# Patient Record
Sex: FEMALE | Race: WHITE | Employment: UNEMPLOYED | URBAN - NONMETROPOLITAN AREA
[De-identification: names, ages, dates, MRNs, and addresses within clinical notes are randomized per-mention and may not be internally consistent; named-entity substitution may affect disease eponyms.]

---

## 2017-04-06 ENCOUNTER — TRANSFERRED RECORDS (OUTPATIENT)
Dept: HEALTH INFORMATION MANAGEMENT | Facility: HOSPITAL | Age: 27
End: 2017-04-06

## 2017-05-04 ENCOUNTER — TRANSFERRED RECORDS (OUTPATIENT)
Dept: HEALTH INFORMATION MANAGEMENT | Facility: HOSPITAL | Age: 27
End: 2017-05-04

## 2017-05-23 ENCOUNTER — OFFICE VISIT (OUTPATIENT)
Dept: OBGYN | Facility: OTHER | Age: 27
End: 2017-05-23
Attending: OBSTETRICS & GYNECOLOGY
Payer: COMMERCIAL

## 2017-05-23 VITALS
WEIGHT: 191 LBS | HEART RATE: 80 BPM | HEIGHT: 61 IN | DIASTOLIC BLOOD PRESSURE: 68 MMHG | OXYGEN SATURATION: 98 % | SYSTOLIC BLOOD PRESSURE: 112 MMHG | BODY MASS INDEX: 36.06 KG/M2

## 2017-05-23 DIAGNOSIS — Z30.2 ENCOUNTER FOR STERILIZATION: Primary | ICD-10-CM

## 2017-05-23 PROCEDURE — 99212 OFFICE O/P EST SF 10 MIN: CPT

## 2017-05-23 PROCEDURE — 99202 OFFICE O/P NEW SF 15 MIN: CPT | Performed by: OBSTETRICS & GYNECOLOGY

## 2017-05-23 RX ORDER — LACTOBACILLUS COMBINATION NO.9 4B CELL
2 CAPSULE ORAL DAILY
COMMUNITY
Start: 2017-04-10 | End: 2018-04-06

## 2017-05-23 ASSESSMENT — PAIN SCALES - GENERAL: PAINLEVEL: NO PAIN (0)

## 2017-05-23 NOTE — MR AVS SNAPSHOT
"              After Visit Summary   2017    Sarina Vila    MRN: [id number]           Patient Information     Date Of Birth          1990        Visit Information        Provider Department      2017 9:00 AM Karthik Epperson MD [facility]        Care Instructions     Federal sterilization request forms will need to be reviewed reviewed and signed after 20 weeks. Pt instructed to do this with Dr. Martinez.          Follow-ups after your visit        Who to contact     If you have questions or need follow up information about today's clinic visit or your schedule please contact [facility] directly at [phone].  Normal or non-critical lab and imaging results will be communicated to you by MyChart, letter or phone within 4 business days after the clinic has received the results. If you do not hear from us within 7 days, please contact the clinic through [employer] [facility] or phone. If you have a critical or abnormal lab result, we will notify you by phone as soon as possible.  Submit refill requests through [employer] or call your pharmacy and they will forward the refill request to us. Please allow 3 business days for your refill to be completed.          Additional Information About Your Visit        MyChart Information     [employer] lets you send messages to your doctor, view your test results, renew your prescriptions, schedule appointments and more. To sign up, go to [email or web address] . Click on \"Log in\" on the left side of the screen, which will take you to the Welcome page. Then click on \"Sign up Now\" on the right side of the page.     You will be asked to enter the access code listed below, as well as some personal information. Please follow the directions to create your username and password.     Your access code is: [email or web address]  Expires: 2017 10:01 AM     Your access code will  in 90 days. If you need help or a new code, please call your [facility] or " "[phone].        Care EveryWhere ID     This is your Care EveryWhere ID. This could be used by other organizations to access your [facility] medical records  [id number]        Your Vitals Were     Pulse Height Pulse Oximetry BMI (Body Mass Index)          80 5' 1\" (1.549 m) 98% 36.09 kg/m2         Blood Pressure from Last 3 Encounters:   05/23/17 112/68   11/29/16 122/70   10/31/16 127/87    Weight from Last 3 Encounters:   05/23/17 191 lb (86.6 kg)   11/29/16 180 lb (81.6 kg)   09/13/16 168 lb (76.2 kg)              Today, you had the following     No orders found for display       Primary Care Provider Office Phone # Fax #    Marcellus Martinez [phone]       [facility] [address] [place] [id number]        Thank you!     Thank you for choosing [facility]  for your care. Our goal is always to provide you with excellent care. Hearing back from our patients is one way we can continue to improve our services. Please take a few minutes to complete the written survey that you may receive in the mail after your visit with us. Thank you!             Your Updated Medication List - Protect others around you: Learn how to safely use, store and throw away your medicines at www.disposemymeds.org.          This list is accurate as of: 5/23/17 10:01 AM.  Always use your most recent med list.                   Brand Name Dispense Instructions for use    [employer] PRENATAL GUMMY 0.4-113.5 MG Chew      Take 2 each by mouth daily         "

## 2017-05-23 NOTE — PROGRESS NOTES
"CC:  Consult from Dr Martinez for postpartum Sterilization  HPI:  Sarina Vila is a 27 year old female is a   P1.  No LMP recorded. Patient is pregnant.  15 weeks.  Pt desiring permanents sterilization.  100% sure.      Previous contraception  Depo/BCP's    Patients records are available and reviewed at today's visit.    Past GYN history:  KIMMIE 1 10/16    Prenatal record reviewed and otherwise unchanged.    Allergies: Clindamycin    Current Outpatient Prescriptions   Medication Sig Dispense Refill     Prenatal Vit-Min-FA-Fish Oil ([employer] PRENATAL GUMMY) 0.4-113.5 MG CHEW Take 2 each by mouth daily         ROS:  C: NEGATIVE for fever, chills, change in weight  GI: NEGATIVE for nausea, abdominal pain, heartburn, or change in bowel habits  : NEGATIVE for frequency, dysuria, hematuria, vaginal discharge      EXAM:  Blood pressure 112/68, pulse 80, height 5' 1\" (1.549 m), weight 191 lb (86.6 kg), SpO2 98 %, not currently breastfeeding.   BMI= Body mass index is 36.09 kg/(m^2).  General - pleasant female in no acute distress.  Musculoskeletal - no gross deformities.  Neurological - normal mental status.  Extremities:  No edema      ASSESSMENT/PLAN:  Desires Sterilization  Contraceptive alternatives discussed.  IUD and sterilization/vasectomy info given to pt.  Reviewed goals, risks, alternatives for tubal occlusion procedure including risk of anesthesia, bleeding, infection, damage to nerves, blood vessels, bowel and bladder. Discussed irreversibility of procedure, 1% failure rate, tubal pregnancy, menstrual changes and regret.  Discussed recovery period and expected discomfort.. All questions were answered.  Federal sterilization request forms will need to be reviewed reviewed and signed after 20 weeks. Pt instructed to do this with Dr. Martinez.  20 minutes were spent with the patient with greater than 50% of the visit spent in face-to-face counseling and coordination of care.          Karthik Epperson MD  "

## 2017-05-23 NOTE — PATIENT INSTRUCTIONS
Federal sterilization request forms will need to be reviewed reviewed and signed after 20 weeks. Pt instructed to do this with Dr. Martinez.

## 2017-05-23 NOTE — NURSING NOTE
"Chief Complaint   Patient presents with     Consult     Juan/ vic post partum tubal     Prenatal Care     15 weeks       Initial /68 (BP Location: Left arm, Cuff Size: Adult Large)  Pulse 80  Ht 5' 1\" (1.549 m)  Wt 191 lb (86.6 kg)  SpO2 98%  BMI 36.09 kg/m2 Estimated body mass index is 36.09 kg/(m^2) as calculated from the following:    Height as of this encounter: 5' 1\" (1.549 m).    Weight as of this encounter: 191 lb (86.6 kg).  Medication Reconciliation: complete     Nelly Parrish      "

## 2017-06-08 ENCOUNTER — TRANSFERRED RECORDS (OUTPATIENT)
Dept: HEALTH INFORMATION MANAGEMENT | Facility: HOSPITAL | Age: 27
End: 2017-06-08

## 2017-06-12 DIAGNOSIS — Z34.92 ENCOUNTER FOR SUPERVISION OF NORMAL PREGNANCY IN SECOND TRIMESTER: Primary | ICD-10-CM

## 2017-06-27 ENCOUNTER — HOSPITAL ENCOUNTER (OUTPATIENT)
Dept: ULTRASOUND IMAGING | Facility: HOSPITAL | Age: 27
Discharge: HOME | End: 2017-06-27
Attending: FAMILY MEDICINE | Admitting: FAMILY MEDICINE
Payer: COMMERCIAL

## 2017-06-27 PROCEDURE — 76805 OB US >/= 14 WKS SNGL FETUS: CPT | Mod: TC

## 2017-09-25 ENCOUNTER — TRANSFERRED RECORDS (OUTPATIENT)
Dept: HEALTH INFORMATION MANAGEMENT | Facility: HOSPITAL | Age: 27
End: 2017-09-25

## 2017-10-05 ENCOUNTER — HOSPITAL ENCOUNTER (OUTPATIENT)
Facility: HOSPITAL | Age: 27
Discharge: HOME | End: 2017-10-05
Attending: FAMILY MEDICINE | Admitting: FAMILY MEDICINE
Payer: COMMERCIAL

## 2017-10-05 VITALS
WEIGHT: 194 LBS | HEIGHT: 61 IN | DIASTOLIC BLOOD PRESSURE: 53 MMHG | TEMPERATURE: 97.7 F | OXYGEN SATURATION: 99 % | SYSTOLIC BLOOD PRESSURE: 102 MMHG | RESPIRATION RATE: 16 BRPM | BODY MASS INDEX: 36.63 KG/M2 | HEART RATE: 76 BPM

## 2017-10-05 PROBLEM — Z36.89 ENCOUNTER FOR TRIAGE IN PREGNANT PATIENT: Status: ACTIVE | Noted: 2017-10-05

## 2017-10-05 PROCEDURE — 99213 OFFICE O/P EST LOW 20 MIN: CPT | Mod: 25

## 2017-10-05 PROCEDURE — 59025 FETAL NON-STRESS TEST: CPT

## 2017-10-05 NOTE — IP AVS SNAPSHOT
[facility]    [address] [address]    Phone:  [phone]    Fax:  [phone]                                       After Visit Summary   10/5/2017    Sarina Vila    MRN: [id number]           After Visit Summary Signature Page     I have received my discharge instructions, and my questions have been answered. I have discussed any challenges I see with this plan with the nurse or doctor.    ..........................................................................................................................................  Patient/Patient Representative Signature      ..........................................................................................................................................  Patient Representative Print Name and Relationship to Patient    ..................................................               ................................................  Date                                            Time    ..........................................................................................................................................  Reviewed by Signature/Title    ...................................................              ..............................................  Date                                                            Time

## 2017-10-06 NOTE — TREATMENT PLAN
"Sarina Vila is a 27 year old  and Unknown patient came in complaining of Fall    Patient Active Problem List   Diagnosis     Cervical intraepithelial neoplasia grade 2     Normal labor      labor with  delivery, fetus 1     Normal pregnancy     Other viral diseases complicating pregnancy, second trimester     Encounter for sterilization     Encounter for triage in pregnant patient       Pt discharged to home at 7:47 PM and encouraged to rest and drink plenty of fluids.  Pt told to call/return if bleeding more than spotting, water breaks, contractions 3-5 minutes apart that she has to breath through.     Nursing education on labor provided. Self-management instructions reviewed. New none medications and none therapies reviewed. AVS given and signed. All questions answered and patient verbalizes understanding.    /53  Pulse 76  Temp 97.7  F (36.5  C) (Oral)  Resp 16  Ht 1.549 m (5' 1\")  Wt 88 kg (194 lb)  SpO2 99%  BMI 36.66 kg/m2    Cervical status: not examined  Fetal Assessment: Reactive    Discharge support:  Family/Friend Support    Obstetric History       T0      L1     SAB0   TAB0   Ectopic0   Multiple0   Live Births1       # Outcome Date GA Lbr Clark/2nd Weight Sex Delivery Anes PTL Lv   2 Current            1  18/16 36w5d 02:05 / 00:16 2.291 kg (5 lb 0.8 oz) M Vag-Vacuum EPI Y AIDEN      Name: MAMTA,BABYChiara MORELOS      Apgar1:  5                Apgar5: 8          Shani Manuel"

## 2017-10-06 NOTE — PROGRESS NOTES
Fetal Non-Stress Test Results    NST Ordered By: Marcellus Martinez   NST Medical Indication: fall at home    NST Start & Stop Times  NST Start Time: 1925  NST Stop Time: 1945    NST Results  Fetus A   Baseline Rate: 135  Accelerations: Present  Decelerations: None  Interpretation: reactive

## 2017-10-06 NOTE — PLAN OF CARE
OB Triage Note  Sarina Vila  MRN: [id number]  Gestational Age: Unknown      Sarina Vila presents for fall at home (sign/symptom/concern).      States mahesh since none.  Rates pain at 1/10.  Bleeding: none .  Denies LOF.  Reports none  amount.     notified of arrival and condition.  Oriented patient to surroundings. Call light within reach.     FHT: 135  NST Start Time:  NST Stop Time:  NST: reactive  Uterine Assessment:Contractions: none minutes of none quality.    Plan:  -Initial NST, then fetal/uterine monitoring per MD/patient plan.  -Sterile vaginal exam/sterile speculum exam.  -Nursing education on labor  provided.

## 2017-10-06 NOTE — DISCHARGE INSTRUCTIONS

## 2017-10-09 ENCOUNTER — TRANSFERRED RECORDS (OUTPATIENT)
Dept: HEALTH INFORMATION MANAGEMENT | Facility: HOSPITAL | Age: 27
End: 2017-10-09

## 2017-10-20 ENCOUNTER — TELEPHONE (OUTPATIENT)
Dept: OBGYN | Facility: HOSPITAL | Age: 27
End: 2017-10-20

## 2017-10-21 NOTE — TELEPHONE ENCOUNTER
Obstetric Phone Triage- [facility]    **REMEMBER: Review patient's prenatal record including complications with pregnancy and medications patient may be on (insulins, tocolytic, etc.)**      2017 9:09 PM  Sarina Vila 1990   Home Phone [phone]   Mobile Not on file.                       Last office visit/Cervix exam: Dr. Martinez 10/9/17- dilated to 1.5 cm    Gestational Age 36 weeks 3 days    Spoke with Sarina Vila  Patient lives 10 miles away    Reason for call per pt: Sharp abdominal pain while in bathtub/ located in pelvic region/ states they didn't feel like contractions/ 30 seconds long x2-3 times. Only happened when laying back in tub.     Is your provider aware of this concern? no    Did you have any complications with this or a previous pregnancy? (Pre-term labor, C/S, Previa, pre-eclampsia, diabetes) pre term labor with past pregnancy     Are you having contractions? No    Vaginal/rectal pressure: no    What types of activity have you done in the past 24 hours? Regular activity     Have you had intercourse/anything in the vagina in the last 48 hours?   Yes (intercourse this morning)    Are you being treated for any vaginal infections? no    How much fluids have you been drinking? Regular fluid intake     Are you having any bloody show/Bleeding? No     [place]/anything vaginally in the last 24-48 hours? Yes intercourse       Are you having any new vaginal discharge or are you leaking fluids/has your water broken? no     Are you being treated for any vaginal infection? no    Is your baby moving normally? Yes   -If no: Have you monitored your baby's movements? yes    Additional Concerns (abd pain, headache, swelling, visual changes, etc): Declines     Patient Advised: You are always more than welcome to come in and be evaluated. If you decide to stay home it is ok to try tylenol for pain, laying on left side, drinking plenty of fluids and continuing to monitor fetal movement and  kick counts. Advised to call back or come in to be evaluated if concerns persist.    Call taken by: Soheila Figueroa

## 2017-10-24 ENCOUNTER — TRANSFERRED RECORDS (OUTPATIENT)
Dept: HEALTH INFORMATION MANAGEMENT | Facility: HOSPITAL | Age: 27
End: 2017-10-24

## 2017-10-30 ENCOUNTER — TRANSFERRED RECORDS (OUTPATIENT)
Dept: HEALTH INFORMATION MANAGEMENT | Facility: HOSPITAL | Age: 27
End: 2017-10-30

## 2017-11-06 ENCOUNTER — TRANSFERRED RECORDS (OUTPATIENT)
Dept: HEALTH INFORMATION MANAGEMENT | Facility: HOSPITAL | Age: 27
End: 2017-11-06

## 2017-11-06 ENCOUNTER — HOSPITAL ENCOUNTER (OUTPATIENT)
Facility: HOSPITAL | Age: 27
Discharge: HOME | End: 2017-11-06
Attending: FAMILY MEDICINE | Admitting: FAMILY MEDICINE
Payer: COMMERCIAL

## 2017-11-06 ENCOUNTER — HOSPITAL ENCOUNTER (INPATIENT)
Facility: HOSPITAL | Age: 27
LOS: 2 days | Discharge: HOME | End: 2017-11-08
Attending: FAMILY MEDICINE | Admitting: FAMILY MEDICINE
Payer: COMMERCIAL

## 2017-11-06 VITALS
WEIGHT: 196 LBS | RESPIRATION RATE: 16 BRPM | TEMPERATURE: 98.2 F | DIASTOLIC BLOOD PRESSURE: 81 MMHG | SYSTOLIC BLOOD PRESSURE: 132 MMHG | HEART RATE: 90 BPM | BODY MASS INDEX: 37 KG/M2 | HEIGHT: 61 IN

## 2017-11-06 DIAGNOSIS — Z98.890 POST-OPERATIVE STATE: Primary | ICD-10-CM

## 2017-11-06 LAB
ABO + RH BLD: NORMAL
ABO + RH BLD: NORMAL
HGB BLD-MCNC: 9.9 G/DL (ref 11.7–15.7)
PLATELET # BLD AUTO: 140 10E9/L (ref 150–450)
SPECIMEN EXP DATE BLD: NORMAL

## 2017-11-06 PROCEDURE — 36415 COLL VENOUS BLD VENIPUNCTURE: CPT | Performed by: FAMILY MEDICINE

## 2017-11-06 PROCEDURE — 25000128 H RX IP 250 OP 636

## 2017-11-06 PROCEDURE — 99213 OFFICE O/P EST LOW 20 MIN: CPT | Mod: 25

## 2017-11-06 PROCEDURE — 85018 HEMOGLOBIN: CPT | Performed by: FAMILY MEDICINE

## 2017-11-06 PROCEDURE — 86901 BLOOD TYPING SEROLOGIC RH(D): CPT | Performed by: FAMILY MEDICINE

## 2017-11-06 PROCEDURE — 10907ZC DRAINAGE OF AMNIOTIC FLUID, THERAPEUTIC FROM PRODUCTS OF CONCEPTION, VIA NATURAL OR ARTIFICIAL OPENING: ICD-10-PCS | Performed by: FAMILY MEDICINE

## 2017-11-06 PROCEDURE — 86900 BLOOD TYPING SEROLOGIC ABO: CPT | Performed by: FAMILY MEDICINE

## 2017-11-06 PROCEDURE — 72200001 ZZH LABOR CARE VAGINAL DELIVERY SINGLE: Performed by: FAMILY MEDICINE

## 2017-11-06 PROCEDURE — 25000132 ZZH RX MED GY IP 250 OP 250 PS 637: Performed by: FAMILY MEDICINE

## 2017-11-06 PROCEDURE — 85049 AUTOMATED PLATELET COUNT: CPT | Performed by: FAMILY MEDICINE

## 2017-11-06 PROCEDURE — 12000027 ZZH R&B OB

## 2017-11-06 RX ORDER — FENTANYL CITRATE 50 UG/ML
50-100 INJECTION, SOLUTION INTRAMUSCULAR; INTRAVENOUS
Status: DISCONTINUED | OUTPATIENT
Start: 2017-11-06 | End: 2017-11-08 | Stop reason: CLARIF

## 2017-11-06 RX ORDER — OXYTOCIN 10 [USP'U]/ML
INJECTION, SOLUTION INTRAMUSCULAR; INTRAVENOUS
Status: COMPLETED
Start: 2017-11-06 | End: 2017-11-06

## 2017-11-06 RX ORDER — MISOPROSTOL 200 UG/1
TABLET ORAL
Status: DISCONTINUED
Start: 2017-11-06 | End: 2017-11-06 | Stop reason: WASHOUT

## 2017-11-06 RX ORDER — LANOLIN 100 %
OINTMENT (GRAM) TOPICAL
Status: DISCONTINUED | OUTPATIENT
Start: 2017-11-06 | End: 2017-11-08 | Stop reason: HOSPADM

## 2017-11-06 RX ORDER — MISOPROSTOL 200 UG/1
400 TABLET ORAL
Status: DISCONTINUED | OUTPATIENT
Start: 2017-11-06 | End: 2017-11-08 | Stop reason: HOSPADM

## 2017-11-06 RX ORDER — LIDOCAINE HYDROCHLORIDE 10 MG/ML
INJECTION, SOLUTION EPIDURAL; INFILTRATION; INTRACAUDAL; PERINEURAL
Status: DISCONTINUED
Start: 2017-11-06 | End: 2017-11-07 | Stop reason: HOSPADM

## 2017-11-06 RX ORDER — IBUPROFEN 400 MG/1
400-800 TABLET, FILM COATED ORAL EVERY 6 HOURS PRN
Status: DISCONTINUED | OUTPATIENT
Start: 2017-11-06 | End: 2017-11-08 | Stop reason: HOSPADM

## 2017-11-06 RX ORDER — OXYCODONE AND ACETAMINOPHEN 5; 325 MG/1; MG/1
1 TABLET ORAL
Status: DISCONTINUED | OUTPATIENT
Start: 2017-11-06 | End: 2017-11-08 | Stop reason: CLARIF

## 2017-11-06 RX ORDER — ACETAMINOPHEN 325 MG/1
650 TABLET ORAL EVERY 4 HOURS PRN
Status: DISCONTINUED | OUTPATIENT
Start: 2017-11-06 | End: 2017-11-06

## 2017-11-06 RX ORDER — ACETAMINOPHEN 325 MG/1
650 TABLET ORAL EVERY 4 HOURS PRN
Status: DISCONTINUED | OUTPATIENT
Start: 2017-11-06 | End: 2017-11-08 | Stop reason: HOSPADM

## 2017-11-06 RX ORDER — OXYTOCIN/0.9 % SODIUM CHLORIDE 30/500 ML
340 PLASTIC BAG, INJECTION (ML) INTRAVENOUS CONTINUOUS PRN
Status: DISCONTINUED | OUTPATIENT
Start: 2017-11-06 | End: 2017-11-08 | Stop reason: HOSPADM

## 2017-11-06 RX ORDER — OXYTOCIN 10 [USP'U]/ML
10 INJECTION, SOLUTION INTRAMUSCULAR; INTRAVENOUS ONCE
Status: COMPLETED | OUTPATIENT
Start: 2017-11-06 | End: 2017-11-06

## 2017-11-06 RX ORDER — ONDANSETRON 2 MG/ML
4 INJECTION INTRAMUSCULAR; INTRAVENOUS EVERY 6 HOURS PRN
Status: DISCONTINUED | OUTPATIENT
Start: 2017-11-06 | End: 2017-11-08 | Stop reason: CLARIF

## 2017-11-06 RX ORDER — OXYCODONE HYDROCHLORIDE 5 MG/1
5-10 TABLET ORAL
Status: DISCONTINUED | OUTPATIENT
Start: 2017-11-06 | End: 2017-11-08 | Stop reason: HOSPADM

## 2017-11-06 RX ORDER — NALOXONE HYDROCHLORIDE 0.4 MG/ML
.1-.4 INJECTION, SOLUTION INTRAMUSCULAR; INTRAVENOUS; SUBCUTANEOUS
Status: DISCONTINUED | OUTPATIENT
Start: 2017-11-06 | End: 2017-11-08 | Stop reason: CLARIF

## 2017-11-06 RX ORDER — IBUPROFEN 800 MG/1
800 TABLET, FILM COATED ORAL
Status: DISCONTINUED | OUTPATIENT
Start: 2017-11-06 | End: 2017-11-06

## 2017-11-06 RX ORDER — OXYTOCIN/0.9 % SODIUM CHLORIDE 30/500 ML
PLASTIC BAG, INJECTION (ML) INTRAVENOUS
Status: DISCONTINUED
Start: 2017-11-06 | End: 2017-11-07 | Stop reason: HOSPADM

## 2017-11-06 RX ORDER — OXYTOCIN 10 [USP'U]/ML
10 INJECTION, SOLUTION INTRAMUSCULAR; INTRAVENOUS
Status: DISCONTINUED | OUTPATIENT
Start: 2017-11-06 | End: 2017-11-08 | Stop reason: HOSPADM

## 2017-11-06 RX ADMIN — OXYTOCIN 10 UNITS: 10 INJECTION, SOLUTION INTRAMUSCULAR; INTRAVENOUS at 18:28

## 2017-11-06 RX ADMIN — IBUPROFEN 800 MG: 400 TABLET ORAL at 19:42

## 2017-11-06 NOTE — H&P
No significant change in general health status based on examination of the patient, review of Nursing Admission Database and prenatal record. Rh+, GBS-. Presented in labor at 7 cm. Already 9 cm by the time I arrived. Wants intrathecal, but not sure that she will have time. Bulging BOW.    EFW: 8lb

## 2017-11-06 NOTE — IP AVS SNAPSHOT
[facility]    [address] [address]    Phone:  [phone]    Fax:  [phone]                                       After Visit Summary   11/6/2017    Sarina Vila    MRN: [id number]           After Visit Summary Signature Page     I have received my discharge instructions, and my questions have been answered. I have discussed any challenges I see with this plan with the nurse or doctor.    ..........................................................................................................................................  Patient/Patient Representative Signature      ..........................................................................................................................................  Patient Representative Print Name and Relationship to Patient    ..................................................               ................................................  Date                                            Time    ..........................................................................................................................................  Reviewed by Signature/Title    ...................................................              ..............................................  Date                                                            Time

## 2017-11-06 NOTE — PLAN OF CARE
Patient admitted for contractions since noon.  Patient appears comfortable.  Patient denies leaking of fluid and bleeding.  Patient thinks she may be in labor.  Will assess and call MD

## 2017-11-06 NOTE — IP AVS SNAPSHOT
MRN:[id number]                      After Visit Summary   11/6/2017    Sarina Vila    MRN: [id number]           Thank you!     Thank you for choosing [facility] for your care. Our goal is always to provide you with excellent care. Hearing back from our patients is one way we can continue to improve our services. Please take a few minutes to complete the written survey that you may receive in the mail after you visit with us. Thank you!        Patient Information     Date Of Birth          1990        Designated Caregiver       Most Recent Value    Caregiver    Will someone help with your care after discharge? yes    Name of designated caregiver Wan Pearce    Phone number of caregiver [phone]    Caregiver address [address] [facility]      About your hospital stay     You were admitted on:  November 6, 2017 You last received care in the:  [facility]    You were discharged on:  November 6, 2017       Who to Call     For medical emergencies, please call 911.  For non-urgent questions about your medical care, please call your primary care provider or clinic, [phone]          Attending Provider     Provider Specialty    Marcellus Martinez MD [facility]       Primary Care Provider Office Phone # Fax #    Marcellus Martinez [phone]      Further instructions from your care team       Discharge Instructions for Undelivered Patients    Diet:  * Drink 8 to 12 glasses of liquids (milk, juice, water) every day  * You may eat meals and snacks.    Activity:  * Count fetal kicks every day.  * Call your doctor if your baby is moving less than usual.    Call your provider if you notice:  * Swelling in your face or increased swelling in your hands or legs.  * Headaches that are not relieved by Tylenol (acetaminophen).  * Changes in your vision (blurring; seeing spots or stars).  * Nausea (sick to your stomach) and vomiting (throwing up).  * Weight gain of 5 pounds  "per week.  * Heartburn that doesn't go away.  * Signs of bladder infection: Pain when you urinate (use the toilet), needing to go more often or more urgently.  * The bag of fisher (membrane) breaks, or you notice leaking in your underwear.  * Bright red blood in your underwear.  * Abdominal (lower belly) or stomach pain.  * For first baby: Contractions (tightenings) less than 5 minutes apart for one hour or more.  * Second (plus) baby: Contractions (tightenings) less than 10 minutes apart and getting stronger.  * Increase or change in vaginal discharge (note the color and amount).        [facility]: [phone]          Pending Results     No orders found from 2017 to 2017.            Admission Information     Date & Time Provider Department Dept. Phone    2017 Marcellus Martinez MD [facility] [phone]      Your Vitals Were     Blood Pressure Pulse Temperature Respirations Height Weight    132/81 90 98.2  F (36.8  C) (Oral) 16 1.549 m (5' 1\") 88.9 kg (196 lb)    BMI (Body Mass Index)                   37.03 kg/m2           MyChart Information     [employer] lets you send messages to your doctor, view your test results, renew your prescriptions, schedule appointments and more. To sign up, go to [email or web address] . Click on \"Log in\" on the left side of the screen, which will take you to the Welcome page. Then click on \"Sign up Now\" on the right side of the page.     You will be asked to enter the access code listed below, as well as some personal information. Please follow the directions to create your username and password.     Your access code is: [email or web address]  Expires: 1/3/2018  6:41 PM     Your access code will  in 90 days. If you need help or a new code, please call your [facility] clinic or [phone].        Care EveryWhere ID     This is your Care EveryWhere ID. This could be used by other organizations to access your [facility] medical " records  [id number]        Equal Access to Services     [facility] : Hadii tom galicia osmanidamon Sodesmondali, waaxda luqadaha, qaybta garyginobrittany vick, deangelo oronaandreeaewa kim. So [facility] [phone].    ATENCIÓN: Si habla español, tiene a kay disposición servicios gratuitos de asistencia lingüística. Llame al [phone].    We comply with applicable federal civil rights laws and [place] laws. We do not discriminate on the basis of race, color, national origin, age, disability, sex, sexual orientation, or gender identity.               Review of your medicines      UNREVIEWED medicines. Ask your doctor about these medicines        Dose / Directions    [employer] PRENATAL GUMMY 0.4-113.5 MG Chew        Dose:  2 each   Take 2 each by mouth daily   Refills:  0                Protect others around you: Learn how to safely use, store and throw away your medicines at www.disposemymeds.org.             Medication List: This is a list of all your medications and when to take them. Check marks below indicate your daily home schedule. Keep this list as a reference.      Medications           Morning Afternoon Evening Bedtime As Needed    [employer] PRENATAL GUMMY 0.4-113.5 MG Chew   Take 2 each by mouth daily                                          More Information        Labor and Childbirth: Active Labor  During active labor, your contractions will be stronger and more rhythmic than with early labor. They peak and subside like waves. They may happen 3 to 5 minutes apart and last about 45 to 60 seconds. This part of labor can be hard work. But it is often shorter than early labor. When you reach active labor, exams and tests will be done to see how you and your baby are doing.     Your cervix may dilate 4 to 8 centimeters during the first part of active labor.   Evaluating you and your baby  An exam tells how you and your baby are responding to contractions. Your blood pressure, temperature, and pulse will be checked. A  blood or urine sample may also be taken. A fetal monitor will be used to check your baby s heart rate. Sometimes an IV (intravenous) line is started to give you medicine and fluids.  Moving ahead with labor  You may now feel contractions in your whole stomach instead of just the lower part (like during early labor). If your amniotic sac has not broken already, it may break now. Or, it may be broken for you. To help your baby descend, change position often. Walking or sitting in a rocking chair or recliner may help. You may find it hard to relax even though you are tired. You may also be less interested in talking than you were earlier. If you re having anesthesia, you will get it now.   Special issues during labor  If labor doesn t progress well or a problem arises, you may need a . But your healthcare providers may take certain steps to help you avoid a :    If your cervix isn t dilating, a medicine (oxytocin) may be used to augment labor.    If fetal monitoring shows your baby isn t getting enough oxygen, shifting your body position may help. You may also be given oxygen through a mask.    If you have preeclampsia (a condition that results in high blood pressure, swelling, and other symptoms), you may be given medicines by IV (intravenous). Your healthcare provider may also tell you to lie on your left side.  Responding to contractions  During contractions, try to stay relaxed. Tense muscles use more oxygen, eat up your body s energy, and increase pain. Use the breathing and relaxation techniques you may have learned. And let your support person know how he or she can help. If you ve had problems during a previous birth, focus on the present. Keep in mind that no 2 births are the same.     Support person s note  Here's how you can help:    Have the mother walk or change positions at least once an hour. This improves circulation and helps the baby descend.    Keep reminding the mother to breathe and  relax through each contraction.    Reassure her. Try to keep her from getting anxious or overstressed.    Take care of yourself. Take a short break to eat or go to the bathroom when you need to.    Rest when the mother does. You ll both benefit.   Date Last Reviewed: 8/16/2015 2000-2017 [employer]. [address] [place] [address]. All rights reserved. This information is not intended as a substitute for professional medical care. Always follow your healthcare professional's instructions.                Labor and Childbirth: Your Body Prepares  Labor is the series of uterine contractions that dilate (open) and efface (thin) your cervix for birth. Your due date is a guide to when labor will begin, but babies often come days or weeks before or after due dates. Even so, labor need not take you by surprise. In the last weeks of pregnancy, you or your healthcare provider may notice changes that mean labor is near.     Changes in your body  Physical changes often signal that your baby will soon be born:    Discharge from your vagina may increase and become thicker. You may notice a pink or brownish discharge called the bloody show.    The mucous plug may break down over a few weeks or all at once. Losing the plug doesn t mean that labor will start right away.    You may feel [place] contractions (false labor). These irregular contractions start to soften and thin the cervix. Many women mistake these contractions for true labor. They may be more noticeable towards the end of the day.    Feeling like the baby has dropped lower. In preparation for birth, the baby's head has settled deep into your pelvis.   Date Last Reviewed: 8/16/2015 2000-2017 [employer]. [address] [place] [address]. All rights reserved. This information is not intended as a substitute for professional medical care. Always follow your healthcare professional's  instructions.                Recognizing Labor    The beginning of labor is the beginning of birth. You ll start to feel strong contractions. That s when the muscles of your uterus tighten up to help push your baby out during birth.  Yes, labor has probably started   Signs of labor include:    Your contractions are getting stronger and more painful instead of weaker. You ll probably feel them throughout your whole uterus.    Your contractions are regular. This means that you feel them about every 5 to 10 minutes. And they are getting closer together.    You have pink-colored or blood-streaked fluid from your vagina.    Your water breaks. It may be a gush or a slow trickle of clear fluid from your vagina.  No, it s probably not real labor   Signs of false labor include:    Your contractions aren t regular or strong.    You feel the contractions only in your lower uterus.    Your contractions go away when you walk or change position.    Your contractions go away after drinking fluids.  When to call your healthcare provider  Call your healthcare provider or clinic right away if you notice any of these signs:    Fluid from your vagina, with or without contractions.    Bleeding heavy enough that you need a sanitary pad.    You don t feel your baby moving as much as before.     NOTE: Contractions are timed by both of these measures:    The length of each contraction from its start to its finish.    How far apart the contractions are -- the time between the start of one contraction and the start of the next contraction.   Date Last Reviewed: 8/9/2015 2000-2017 [employer]. [address] [place] [address], [place] [address]. All rights reserved. This information is not intended as a substitute for professional medical care. Always follow your healthcare professional's instructions.

## 2017-11-06 NOTE — DISCHARGE INSTRUCTIONS

## 2017-11-06 NOTE — PLAN OF CARE
"Sarina Vila is a 27 year old  and 38w6d patient came in complaining of Rule Out Labor    Patient Active Problem List   Diagnosis     Cervical intraepithelial neoplasia grade 2     Normal labor      labor with  delivery, fetus 1     Normal pregnancy     Other viral diseases complicating pregnancy, second trimester     Encounter for sterilization     Encounter for triage in pregnant patient       Pt discharged to home at 1515 PM and encouraged to rest and drink plenty of fluids.  Pt told to call/return if bleeding more than spotting, water breaks, contractions 10 minutes apart that she has to breath through.     Nursing education on early labor comfort measures and risks provided. Self-management instructions reviewed. AVS given and signed. All questions answered and patient verbalizes understanding.    /81  Pulse 90  Temp 98.2  F (36.8  C) (Oral)  Resp 16  Ht 1.549 m (5' 1\")  Wt 88.9 kg (196 lb)  BMI 37.03 kg/m2    Cervical status: mid position, dilated to 4, effaced to 60% and soft  Fetal Assessment: Reactive    Discharge support:  Family/Friend Support    Obstetric History       T0      L1     SAB0   TAB0   Ectopic0   Multiple0   Live Births1       # Outcome Date GA Lbr Clark/2nd Weight Sex Delivery Anes PTL Lv   2 Current            1  18/16 36w5d 02:05 / 00:16 2.291 kg (5 lb 0.8 oz) M Vag-Vacuum EPI Y AIDEN      Name: MAMTA,BABYChiara MORELOS      Apgar1:  5                Apgar5: 8          JAMISON ANDREWS"

## 2017-11-06 NOTE — IP AVS SNAPSHOT
MRN:[id number]                      After Visit Summary   11/6/2017    Sarina Vila    MRN: [id number]           Thank you!     Thank you for choosing [facility] for your care. Our goal is always to provide you with excellent care. Hearing back from our patients is one way we can continue to improve our services. Please take a few minutes to complete the written survey that you may receive in the mail after you visit with us. Thank you!        Patient Information     Date Of Birth          1990        Designated Caregiver       Most Recent Value    Caregiver    Will someone help with your care after discharge? yes [Simultaneous filing. User may not have seen previous data.]    Name of designated caregiver Wan Pearce    Phone number of caregiver [phone]    Caregiver address [address] [facility]      About your hospital stay     You were admitted on:  November 6, 2017 You last received care in the:  [facility]    You were discharged on:  November 8, 2017        Reason for your hospital stay       Have a baby.  Get my tubes tied.                  Who to Call     For medical emergencies, please call 911.  For non-urgent questions about your medical care, please call your primary care provider or clinic, [phone]  For questions related to your surgery, please call your surgery clinic        Attending Provider     Provider Specialty    Marcellus Martinez MD Family Practice       Primary Care Provider Office Phone # Fax #    Marcellus Martinez [phone]      Follow-up Appointments     Follow-up and recommended labs and tests        See me in 6 weeks for postpartum check.                  Further instructions from your care team       No driving today or while on pain meds  Pelvic rest for 4 weeks  Call Dr. Epperson [phone] as necessary if problems in interim, no post op appt needed.  No heavy lifting, vigorous activity, swim, bath, exercise for 2  weeks    Appointment at [facility] with Dr. Martinez on Wednesday,12/20/17, at 10:30 AM!     Postpartum Vaginal Delivery Instructions    Activity    Ask family and friends for help when you need it.  Do not place anything in your vagina until approved by your Physician .  You are not restricted on other activities, but take it easy for a few weeks to allow your body to recover from delivery.  You are able to do any activities you feel up to that point.  No driving until you have stopped taking narcotic pain medications.    Call your health care provider if you have any of these symptoms:    Increased pain, swelling, redness, or fluid around your stiches from an episiotomy or perineal tear.  A fever above 100.4 F (38 C) with or without chills when placing a thermometer under your tongue.  You soak a sanitary pad with blood within 1 hour, or you see blood clots larger than a golf ball.  Bleeding that lasts more than 6 weeks.  Vaginal discharge that smells bad.  Severe pain, cramping or tenderness in your lower belly area.  A need to urinate more frequently (use the toilet more often), more urgently (use the toilet very quickly), or it burns when you urinate.  Nausea and vomiting.  Redness, swelling or pain around a vein in your leg.  Problems breastfeeding or a red or painful area on your breast.  Chest pain and cough or are gasping for air.  Problems coping with sadness, anxiety, or depression.  If you have any concerns about hurting yourself or the baby, call your provider immediately. The Safe Place for Newborns law allows you to bring your baby to the hospital up to 7 days, no questions asked.  You have questions or concerns after you return home.    Keep your hands clean:  Always wash your hands before touching your perineal area and stitches.  This helps reduce your risk of infection.  If your hands aren't dirty, you may use an alcohol hand-rub to clean your hands. Keep your nails clean and short.   "    Pending Results     No orders found from 2017 to 2017.            Admission Information     Date & Time Provider Department Dept. Phone    2017 Marcellus Martinez MD [facility] [phone]      Your Vitals Were     Blood Pressure Pulse Temperature Respirations Pulse Oximetry       123/63 74 98.3  F (36.8  C) (Oral) 16 97%       MyChart Information     [employer] lets you send messages to your doctor, view your test results, renew your prescriptions, schedule appointments and more. To sign up, go to [email or web address] . Click on \"Log in\" on the left side of the screen, which will take you to the Welcome page. Then click on \"Sign up Now\" on the right side of the page.     You will be asked to enter the access code listed below, as well as some personal information. Please follow the directions to create your username and password.     Your access code is: [email or web address]  Expires: 1/3/2018  6:41 PM     Your access code will  in 90 days. If you need help or a new code, please call your [place] clinic or [phone].        Care EveryWhere ID     This is your Care EveryWhere ID. This could be used by other organizations to access your [place] medical records  [id number]        Equal Access to Services     [facility] VIMAL : Hadii tom peña Soabelino, waaxda luqadaha, qaybta kaalmada adeegyada, deangelo ewing . So [facility] [phone].    ATENCIÓN: Si habla español, tiene a kay disposición servicios gratuitos de asistencia lingüística. Llame al [phone].    We comply with applicable federal civil rights laws and [place] laws. We do not discriminate on the basis of race, color, national origin, age, disability, sex, sexual orientation, or gender identity.               Review of your medicines      START taking        Dose / Directions    HYDROcodone-acetaminophen 5-325 MG per tablet   Commonly known as:  NORCO   Used for:  Post-operative state        Dose: "  1-2 tablet   Take 1-2 tablets by mouth every 6 hours as needed for moderate to severe pain   Quantity:  30 tablet   Refills:  0       ibuprofen 800 MG tablet   Commonly known as:  ADVIL/MOTRIN   Used for:  Post-operative state        Dose:  800 mg   Take 1 tablet (800 mg) by mouth every 8 hours as needed for moderate pain   Quantity:  40 tablet   Refills:  1         CONTINUE these medicines which have NOT CHANGED        Dose / Directions    [facility] PRENATAL GUMMY 0.4-113.5 MG Chew        Dose:  2 each   Take 2 each by mouth daily   Refills:  0            Where to get your medicines      These medications were sent to [id number] - [place], [place] - [address]   [address] , [place] [address]     Phone:  [phone]     ibuprofen 800 MG tablet         Some of these will need a paper prescription and others can be bought over the counter. Ask your nurse if you have questions.     Bring a paper prescription for each of these medications     HYDROcodone-acetaminophen 5-325 MG per tablet                Protect others around you: Learn how to safely use, store and throw away your medicines at www.disposemymeds.org.             Medication List: This is a list of all your medications and when to take them. Check marks below indicate your daily home schedule. Keep this list as a reference.      Medications           Morning Afternoon Evening Bedtime As Needed    [facility] PRENATAL GUMMY 0.4-113.5 MG Chew   Take 2 each by mouth daily                                HYDROcodone-acetaminophen 5-325 MG per tablet   Commonly known as:  NORCO   Take 1-2 tablets by mouth every 6 hours as needed for moderate to severe pain                                ibuprofen 800 MG tablet   Commonly known as:  ADVIL/MOTRIN   Take 1 tablet (800 mg) by mouth every 8 hours as needed for moderate pain   Last time this was given:  800 mg on 11/6/2017  7:42 PM                                          More Information        Breast Care After  Birth  A few days after your baby s birth, your breasts will swell with milk. They are likely to feel tender and heavy. This is normal. To help prevent breast soreness and control irritation, follow these tips:     Moist heat, like a shower, helps to promote the release of breastmilk.   Coping with swelling  Here are tips to cope with swelling:    Use cold compresses or an ice pack to help reduce the ache or pain.    Breastfeed often to keep milk from clogging your breast ducts.    If your nipples are flat from breast swelling, hand express some milk. Squeeze out a few drops of milk by massaging and compressing your breasts.    If you have swelling with pain or fever, call your healthcare provider.  Preventing sore nipples  Here are tips to prevent sore nipples:    Make sure baby latches on to your breast correctly. The baby s mouth should be opened very wide and your entire areola should be in the baby's mouth.    You can let milk dry on your nipples. This dried milk can protect the skin on your nipple.    Do not use alcohol, soap, or scented cleansers on your breasts. These can cause the nipples to dry and crack.    Do not wear nursing pads that are lined with plastic. They hold in moisture and can cause chapping.    If you have cracked or bleeding nipples, consult your healthcare provider or a lactation consultant. He or she will make sure that your baby's latch is correct and may suggest topical treatment, like pure lanolin.  Choosing a good bra  Wearing the right-sized bra is especially important now. If a bra is too tight, it may cause a duct in your breast to clog and become irritated. If possible, have a salesperson help fit you for a new bra. Look for one that s 100% cotton and comfortable. Also, choose a bra with wide straps that won t dig into your back and shoulders. If you re breastfeeding, find a nursing bra that allows you to uncover one breast at a time.  If you are not breastfeeding  Here are tips  to avoid discomfort:    Avoid stimulation of nipples    Wear a tight-fitting bra    Apply cold compresses or ice packs for discomfort     Call your healthcare provider   Call your healthcare provider right away if you have any of the following:    A fever or chills    Extreme tiredness and body aches, as if you have the flu    Burning or pain in one or both breasts    Red streaks on a breast    Hard or lumpy spots in one or both breasts    A feeling of warmth or heat in one or both breasts    Breasts so swollen your baby cannot latch on to the nipples    Nipples that are cracked or bleeding    Low milk supply or your milk does not flow freely   Date Last Reviewed: 9/7/2015 2000-2017 [employer]. [address] [place] [address], [place] [address]. All rights reserved. This information is not intended as a substitute for professional medical care. Always follow your healthcare professional's instructions.                Nutrition While Breastfeeding  Do I need a special diet for breastfeeding?  You don't have to eat a special diet to produce enough milk for your baby. Also, your milk will be of good quality for your baby regardless of what you eat. However, your body needs fuel to make breastmilk, so eat your fill of a variety of foods. Breastfeeding isn t an excuse to eat and drink everything you want, but it s not a reason to avoid favorite foods either.   Healthy diet for the new mother  A healthy diet is recommended for all women and offers many benefits to the new mother. Choosing a variety of healthy foods creates a pattern for the entire family, and each family member benefits. Women who are breastfeeding need about 500 extra calories per day. Some women might need more, while others might need less. When choosing foods, use the nutrition chart below as a guide.    Bread, cereal, rice, and pasta Vegetables Fruit   Milk, yogurt, and cheese Meat, poultry, fish,  dry beans, eggs, and nuts Fats, oils, and  sweets  (use sparingly)   What s good for you?  Here are some things to do:    Breastfeeding women need to drink when they feel thirsty. There is no specific amount of water you need to drink to make enough milk.    Follow healthy eating guidelines.    Snack on fruit or low-fat dairy products if you re hungry between meals.    If your healthcare provider recommends it, keep taking prenatal vitamins.  What s not good for you?  Here are other things to consider:    Limit fatty foods and foods that are high in sugar (cookies, cakes).    Be aware that what enters your body may pass into your breastmilk. Limit caffeine. It is not just in coffee, but is also in cola, tea, and chocolate.    Talk with your healthcare provider before taking any medicines. It is important to let your healthcare provider know that you are nursing. Some medicines are not safe with breastfeeding.    Remember: alcohol, cigarettes, and drugs also affect your breastmilk and your baby. Talk with your healthcare provider.  Date Last Reviewed: 9/7/2015 2000-2017 [employer]. [address] [place] [address]. All rights reserved. This information is not intended as a substitute for professional medical care. Always follow your healthcare professional's instructions.                Expressing Your Milk  Work, school, or even a late-night movie can require you to be away from your baby. This doesn't mean you have to give up breastfeeding. You can transfer milk from your breast to a bottle (expression) and feed your baby breastmilk in a bottle. But remember, don t give your baby bottles or pacifiers until he or she is at least 4 to 6 weeks old. This helps you both get a good start on breastfeeding. Your baby can get used to your natural nipple first.      Expressing by hand Expressing with double pump      Always wash your hands before expressing milk from your breast to a bottle.   Stimulating letdown    Hold a washcloth under  "very warm water and wring it out.    Place one warm washcloth over each breast to warm them.     Gently massage your breasts to stimulate the milk flow.    Start under the arm and move around the entire breast.     Use the backs of your fingernails to gently scratch the skin of your breasts downward from the outside toward your nipples.     If you re away from your baby, looking at your baby s picture can help your milk let down.  Expressing by hand or pump  Your lactation consultant can help you choose the best method for your needs. Here are some tips:    Expressing by hand reduces pressure in swollen or leaky breasts. It may be a good way to start a pumping session. If you need to give expressed milk to your baby in the first few days after delivery, hand expression can often help get more colostrum than using a pump. Ask your nurse or midwife to teach you how to hand express.    Start expressing within 6 hours of separation from your baby in the hospital.    When  from your baby, it is best to express as often and as long as a baby would breastfeed. Newborns feed 8 to 12 times each 24 hours.    A pump gently pulls your nipple into the cup like a baby s suck and can be the fastest way to express after your milk comes in. Pumps come in manual, battery-operated, and electric styles. To protect your breasts and the milk you pump, follow the instructions that come with your pump.    For sick or premature babies who aren't feeding at the breast, \"hands-on pumping\" is a special way to help be sure you make enough milk. Hands-on pumping involves a combination of both hand expression and an electrical pump.     Hand expressing while using a pump can increase the amount of milk you can pump. It can also increase the fat content of the pumped milk.    You can usually buy or rent a pump from a drugstore or medical equipment store. Check with your hospital to find out where you can buy or rent a pump.  Working and " breastfeeding    Breastfeed your baby all of the time during your maternity leave. This helps set up your milk supply for the whole year.    When your baby is about 2 weeks old, start pumping after you feed the baby. You can freeze this expressed milk. It will help you build a supply for going back to work. Nursing plus pumping will help your breasts make more milk. Talk with your family or childcare provider about timing bottle feedings while you are at work. It s best if your baby is ready to breastfeed when you return from work.    Express milk during work breaks. This helps protect your milk supply. It also helps prevent engorged or leaking breasts.    Arrange to breastfeed at lunch if your childcare is nearby. If not, be sure to pump during your lunch break.    Breastfeed before you leave for work and soon after you return home. Your partner may be able to make dinner while you feed the baby.    Breastfeed at night and on weekends. This will keep up your milk supply. Your baby can have bottled breastmilk during the day while you are at work.  Date Last Reviewed: 3/1/2017    4493-0904 [employer]. [address] [place] [address]. All rights reserved. This information is not intended as a substitute for professional medical care. Always follow your healthcare professional's instructions.                Breastfeeding: Caring for Yourself  When you have a new little person in your life, it s easy to forget about yourself. There are new demands on your time. There are also new responsibilities. But it s important to take care of yourself. This will help you take better care of your new baby.     Healthy habits  Here are some healthy tips:    Get exercise when you can. If you leak milk, it will help to nurse right before the activity.    Avoid smoking. Smoking is unhealthy for you and may cause you to make less milk. Secondhand smoke is also harmful to your baby.    Talk to your healthcare  provider about alcohol, if you choose to drink.    When you re sick, tell your healthcare provider that you are breastfeeding. Few medicines and illnesses affect breastfeeding, but it is important to check.    Ask your healthcare provider before taking any prescription or over-the-counter medicines, herbs, or supplements.  Comfy clothes  Suggestions for being comfortable when breastfeeding include:    Find a comfortable nursing bra. Many women find underwire uncomfortable. Some stores offer on-site fittings. Ask your healthcare provider or nurse for a referral.    If you have leaking milk, place breast pads inside your bra.    Choose an extra-supportive bra for exercise. Or you can wear two bras at the same time for more support.    Wear loose tops that can be lifted for breastfeeding. You can also buy clothes specially made for breastfeeding moms.  A note about sex  After delivery, it may take a while before your interest in sex returns. Share your feelings with your partner. Your healthcare provider will let you know when it is safe to resume having sex. When you re ready, know that:    There are several forms of birth control that can be used while breastfeeding. Ask your healthcare provider what to use for pregnancy prevention while you are nursing.    Breastfeeding hormones may cause vaginal dryness. Some women find using a water-based lubricant makes sex more comfortable.    Milk may let down when you are aroused. Applying pressure on the nipple, using breast pads, or a towel may help with this.  When to call your healthcare provider  Call your healthcare provider if:    You feel overwhelmed and don't know where to turn.    You feel very sad or don t want to be with your baby.    You feel like your baby cries all the time and won't be soothed    You are unable to exercise, or have sex, without discomfort.    You are unsure about a medicine, illness, or activity and its effect on breastfeeding.   Date Last  "Reviewed: 9/7/2015 2000-2017 [employer]. [address] [place] [address], [place] [address]. All rights reserved. This information is not intended as a substitute for professional medical care. Always follow your healthcare professional's instructions.                Understanding Postpartum Depression  You ve just had a baby. You expected to be excited and happy. But instead you find yourself crying for no reason. You may have trouble coping with your daily tasks. You feel sad, tired, and hopeless most of the time. You may even feel ashamed or guilty. But what you re going through is not your fault and you can feel better. Talk to your healthcare provider. He or she can help.    What is depression?  Depression is a mood disorder that affects the way you think and feel. The most common symptom is a feeling of deep sadness. You may also feel as if you just can t cope with life. Other symptoms include:    Gaining or losing a lot of weight    Sleeping too much or too little    Feeling tired all the time    Feeling restless    Crying a lot    Having too little or too much appetite.    Withdrawing from friends and family    Having headaches, aches and pains, or stomach problems that won't go away.    Fears of harming your baby    Lack of interest in your baby    Feeling worthless or guilty    No longer finding pleasure in things you used to    Having trouble thinking clearly or making decisions    Thinking about death or suicide   Depression after childbirth  You may be weepy and tired right after giving birth. These feelings are normal. They re sometimes called the  baby blues.  These blues go away after 1 to 2 weeks. However, postpartum (meaning  after birth ) depression lasts much longer and is more severe than the \"baby blues.\" It can make you feel sad and hopeless. You may also fear that your baby will be harmed and worry about being a bad mother.  What causes postpartum depression?  The exact cause of " postpartum depression is unknown. Changes in brain chemistry or structure are believed to play a big role in depression. It may be due to changes in your hormones during and after childbirth. You may also be tired from caring for your baby and adjusting to being a mother. All these factors may make you feel depressed. In some cases, your genes may also play a role.  Depression can be treated  There are many ways to treat postpartum depression. Talking to your healthcare provider is the first step toward feeling better.  When to call your healthcare provider  Call your healthcare provider if you:     Cry for no clear reason    Have trouble sleeping, eating, and making choices    Questions whether you can handle caring for a baby    Have intense feelings of sadness, anxiety, or despair that prevent you from being able to do your daily tasks  Resources    National [place] of Mental Epxlbf358-690-5712vvf.[facility].nih.gov    National [place] on Mental Govlgng539-994-6595cwc.quyen.org    Mental Health Srexaxh220-554-6695hwk.[facility].org    National Suicide Dcteaun547-337-2275 (800-SUICIDE)   Date Last Reviewed: 8/16/2015 2000-2017 [employer]. [address] [place] [address], [place] [address]. All rights reserved. This information is not intended as a substitute for professional medical care. Always follow your healthcare professional's instructions.

## 2017-11-06 NOTE — IP AVS SNAPSHOT
[facility]    [address] [address]    Phone:  [phone]    Fax:  [phone]                                       After Visit Summary   11/6/2017    Sarina Vila    MRN: [id number]           After Visit Summary Signature Page     I have received my discharge instructions, and my questions have been answered. I have discussed any challenges I see with this plan with the nurse or doctor.    ..........................................................................................................................................  Patient/Patient Representative Signature      ..........................................................................................................................................  Patient Representative Print Name and Relationship to Patient    ..................................................               ................................................  Date                                            Time    ..........................................................................................................................................  Reviewed by Signature/Title    ...................................................              ..............................................  Date                                                            Time

## 2017-11-06 NOTE — PLAN OF CARE
Patient admitted into room 4208 OP for contractions since noon.  Patient appears comfortable.  Patient denies bleeding and leaking of fluid.  Will assess and call MD

## 2017-11-07 LAB
ERYTHROCYTE [DISTWIDTH] IN BLOOD BY AUTOMATED COUNT: 14.7 % (ref 10–15)
HCT VFR BLD AUTO: 28 % (ref 35–47)
HGB BLD-MCNC: 9.4 G/DL (ref 11.7–15.7)
MCH RBC QN AUTO: 24.2 PG (ref 26.5–33)
MCHC RBC AUTO-ENTMCNC: 33.6 G/DL (ref 31.5–36.5)
MCV RBC AUTO: 72 FL (ref 78–100)
PLATELET # BLD AUTO: 164 10E9/L (ref 150–450)
RBC # BLD AUTO: 3.89 10E12/L (ref 3.8–5.2)
WBC # BLD AUTO: 9.3 10E9/L (ref 4–11)

## 2017-11-07 PROCEDURE — 12000027 ZZH R&B OB

## 2017-11-07 PROCEDURE — 36415 COLL VENOUS BLD VENIPUNCTURE: CPT | Performed by: FAMILY MEDICINE

## 2017-11-07 PROCEDURE — 25000132 ZZH RX MED GY IP 250 OP 250 PS 637: Performed by: FAMILY MEDICINE

## 2017-11-07 PROCEDURE — 85027 COMPLETE CBC AUTOMATED: CPT | Performed by: FAMILY MEDICINE

## 2017-11-07 RX ORDER — LIDOCAINE 40 MG/G
CREAM TOPICAL
Status: DISCONTINUED | OUTPATIENT
Start: 2017-11-07 | End: 2017-11-08 | Stop reason: HOSPADM

## 2017-11-07 RX ADMIN — ACETAMINOPHEN 650 MG: 325 TABLET, FILM COATED ORAL at 02:29

## 2017-11-07 NOTE — L&D DELIVERY NOTE
This  presented in active labor. She had hoped for an intrathecal anesthetic, but went too fast to receive one. There was some fetal bradycardia that improved with a hands and knees position. She delivered a 6# 15 oz male infant over an intact perineum. He had a tight nuchal cord, but she did not stop pushing for me to clamp and cut it. I was able to spin him and deliver with the cord in place. He had an immediate cry. Face was bruised secondary to tight cord and precipitous delivery. Mother and baby are bonding in the birthing room.        Labor Event Times    Labor onset date:  17 Onset time:   5:00 PM   Dilation complete date:  17 Complete time:   6:21 PM   Start pushing date/time:  2017            Labor Length    1st Stage (hrs):  1 (min):  21   2nd Stage (hrs):  0 (min):  4      Labor Events     labor?:  No   Labor Type:  Spontaneous   Predominate monitoring during 1st stage:  intermittent electronic fetal monitoring      Antibiotics received during labor?:  No      Rupture date/time: 17   Rupture type:  Artificial Rupture of Membranes   Fluid color:  Clear   Fluid odor:  Normal      1:1 continuous labor support provided by?:  RN Labor partogram used?:  no         Delivery/Placenta Date and Time    Delivery Date:  17 Delivery Time:   6:25 PM   Oxytocin given at the time of delivery:  after delivery of baby      Apgars     1 Minute 5 Minute 10 Minute 15 Minute 20 Minute   Skin color: 0  1       Heart rate: 2  2       Reflex irritability: 2  2       Muscle tone: 1  2       Respiratory effort: 2  2       Total: 7  9          Apgars assigned by:  NOEMI NELSON AND SLICK NOBLES      Cord    Vessels:  3 Vessels Complications:  Nuchal   Cord Blood Disposition:  Lab           Resuscitation        Care at Delivery:  Baby boy delivered by Dr. Martinez.  Hr in 140's RR in 60's.  Dried and stimulated and given to mom.  See plan of care note    Output in Delivery Room:   "Stool       Measurements    Weight:  6 lb 15.1 oz Length:  1' 9\"   Head circumference:  33 cm Chest circumference:  33 cm   Abdominal girth:  30.5 cm      Skin to Skin and Feeding Plan    Skin to skin initiation date/time: 17   Skin to skin with:  Mother   Skin to skin end date/time: 17 183      Breastfeeding initiated date/time:  2017 1858   How do you plan to feed your baby:  Breastfeeding      Labor Events and Shoulder Dystocia    Fetal Tracing Prior to Delivery:  Category 2   Shoulder dystocia present?:  Neg            Delivery (Maternal) (Provider to Complete) (704415)    Episiotomy:  None   Perineal lacerations:  None    Est. blood loss (mL):  150         Mother's Information  Mother: Sarina Vila #[id number]    Start of Mother's Information     IO Blood Loss  17 1700 - 17 2224    Mom's I/O Activity            End of Mother's Information  Mother: Sarina Vila #[id number]            Delivery - Provider to Complete (881456)    Delivering clinician:  MARCELLUS MARTINEZ   Attempted Delivery Types (Choose all that apply):  Spontaneous Vaginal Delivery   Delivery Type (Choose the 1 that will go to the Birth History):  Vaginal, Spontaneous Delivery   Placenta    Delayed Cord Clamping:  Done   Removal:  Spontaneous   Disposition:  Hospital disposal      Anesthesia    Method:  None         Presentation and Position    Presentation:  Vertex   Position:  Right Occiput Anterior                    Marcellus Martinez MD    "

## 2017-11-07 NOTE — PLAN OF CARE
Pt. Doing well. Complains of 1/10 perineum discomfort. Declines motrin at this time. Using icepacks for discomfort. Encouraged whirlpool tub with lavendar. Voiding without difficulty. Fundus firm, small lochia. Attentive to infant needs. Sleeps in between feedings. Will continue to assess.

## 2017-11-07 NOTE — PROGRESS NOTES
[facility] Obstetrics Post-Partum Progress Note          Assessment and Plan:    Assessment:   Post-partum day #1  Normal spontaneous vaginal delivery  L&D complications: None   Borderline hemoglobin and platelets yesterday.  Doing well.      Plan:   Breast feeding strategies discussed  Desires tubal ligation. Dr. Epperson notified of her presence. Plan is for tubal ligation tomorrow.  Hemoglobin and platelets borderline low yesterday. Will recheck.           Interval History:   Doing well.  Pain is well-controlled.  No fevers.  No history of foul-smelling vaginal discharge.  Good appetite. Vaginal bleeding is similar to a heavy menstrual flow.  Ambulatory.  Breastfeeding well.            Significant Problems:    None          Review of Systems:    Minimal perineal discomfort.          Medications:     Current Facility-Administered Medications   Medication     lactated ringers BOLUS 500 mL     lactated ringers BOLUS 1,000 mL    Or     lactated ringers BOLUS 500 mL     nitrous oxide/oxygen 50/50 blend     fentaNYL (PF) (SUBLIMAZE) injection  mcg     naloxone (NARCAN) injection 0.1-0.4 mg     ondansetron (ZOFRAN) injection 4 mg     lidocaine 1 % 0.1-20 mL     oxyCODONE-acetaminophen (PERCOCET) 5-325 MG per tablet 1 tablet     ibuprofen (ADVIL/MOTRIN) tablet 400-800 mg     acetaminophen (TYLENOL) tablet 650 mg     lanolin ointment     lactated ringers BOLUS 1,000 mL     oxytocin (PITOCIN) 30 units in 500 mL 0.9% NaCl infusion     oxytocin (PITOCIN) injection 10 Units     misoprostol (CYTOTEC) tablet 400 mcg     NO Rho (D) immune globulin (RhoGam) needed - mother Rh POSITIVE     oxyCODONE IR (ROXICODONE) tablet 5-10 mg     [START ON 11/8/2017] magnesium hydroxide (MILK OF MAGNESIA) suspension 30 mL     No MMR Needed - Assessment: Patient does not need MMR vaccine     No Tdap Needed - Assessment: Patient does not need Tdap vaccine             Physical Exam:   All vitals stable  Uterine fundus is firm,  non-tender and at the level of the umbilicus  Heart is regular rate and rhythm and lungs clear to auscultation          Data:     Lab Results   Component Value Date    WBC 5.7 10/30/2016    HGB 9.9 (L) 11/06/2017     (L) 11/06/2017          No imaging studies have been ordered  Marcellus Martinez MD

## 2017-11-07 NOTE — PLAN OF CARE
Assessments completed as charted. B/P: 122/82, T: 98.1, P: 78, R: 16. Rates pain: 0/10 . Voiding without difficulty. Fundus: Midline . Lochia: Moderate. Activity: unrestricted with out pain  and normal activity. Infant feeding: Breast feeding going well.     LATCH Score:   Latch: 2 - Good Latch  Audible Swallowin - None  Type of Nipple: (Breast/Nipple) 2 - Everted  Comfort: 2 - Soft, Nontender  Hold: 1 - Min. Assist   Total LATCH Score:     Postpartum breastfeeding assessment completed and education provided, see Patient Education Activity.  Items included in the education are:     proper positioning and latch    effectiveness of feeding    manual expression    handling and storing breastmilk    maintenance of breastfeeding for the first 6 months    sign/symptoms of infant feeding issues requiring referral to qualified health care provider  Postpartum care education provided, see Patient Education activity. Patient denies needs. Will monitor.  Alma Elizondo

## 2017-11-07 NOTE — CONSULTS
Consulted by Dr. Martinez for PP BTO.  Pt ppd #1 from uncomplicated  and was seen and counseled by me during pregnancy for permanent sterilization.    History and physical reviewed on 2017.    Federal sterilization forms singed during pregnancy in Dr. Martinez's office.    We reviewed the risks and benefits of tubal ligation including risks of bleeding, infection, damage to other organs. Risks of tubal failure, and possibility of ectopic pregnancy were also explained.     Consent form reviewed and singed and pt desires to proceed.   Preoperative instructions discussed.    NPO after bryce.      Karthik Epperson MD  5:09 PM

## 2017-11-07 NOTE — PLAN OF CARE
Problem: Patient Care Overview  Goal: Plan of Care/Patient Progress Review  Outcome: Improving  Assessments completed as charted. B/P: 124/71, T: 98.5, P: Data Unavailable, R: 16. Rates pain: 0/10 . Voiding without difficulty. Fundus: Midline @U-1. Lochia: Light. Activity: normal activity. Infant feeding: Breast feeding going well.      LATCH Score:   Latch: 2 - Good Latch  Audible Swallowin - Spontaneous & frequent  Type of Nipple: (Breast/Nipple) 2 - Everted  Comfort: 2 - Soft, Nontender  Hold: 2 - No Assist   Total LATCH Score: 10     Postpartum breastfeeding assessment completed and education provided, see Patient Education Activity.  Items included in the education are:     proper positioning and latch    effectiveness of feeding    manual expression    handling and storing breastmilk    maintenance of breastfeeding for the first 6 months    sign/symptoms of infant feeding issues requiring referral to qualified health care provider  Postpartum care education provided, see Patient Education activity. Patient denies needs. Will monitor.  Marquita Florez           Problem: Postpartum (Vaginal Delivery) (Adult,Obstetrics,Pediatric)  Goal: Signs and Symptoms of Listed Potential Problems Will be Absent, Minimized or Managed (Postpartum)  Signs and symptoms of listed potential problems will be absent, minimized or managed by discharge/transition of care (reference Postpartum (Vaginal Delivery) (Adult,Obstetrics,Pediatric) CPG).   Outcome: Improving    17 2346   Postpartum (Vaginal Delivery)   Problems Assessed (Postpartum Vaginal Delivery) all   Problems Present (Postpartum Vag Deliv) none

## 2017-11-08 ENCOUNTER — ANESTHESIA EVENT (OUTPATIENT)
Dept: SURGERY | Facility: HOSPITAL | Age: 27
End: 2017-11-08
Payer: COMMERCIAL

## 2017-11-08 ENCOUNTER — ANESTHESIA (OUTPATIENT)
Dept: SURGERY | Facility: HOSPITAL | Age: 27
End: 2017-11-08
Payer: COMMERCIAL

## 2017-11-08 VITALS
TEMPERATURE: 98.3 F | RESPIRATION RATE: 16 BRPM | DIASTOLIC BLOOD PRESSURE: 63 MMHG | HEART RATE: 74 BPM | OXYGEN SATURATION: 97 % | SYSTOLIC BLOOD PRESSURE: 123 MMHG

## 2017-11-08 PROBLEM — Z36.89 ENCOUNTER FOR TRIAGE IN PREGNANT PATIENT: Status: RESOLVED | Noted: 2017-10-05 | Resolved: 2017-11-08

## 2017-11-08 PROCEDURE — 36000058 ZZH SURGERY LEVEL 3 EA 15 ADDTL MIN: Performed by: OBSTETRICS & GYNECOLOGY

## 2017-11-08 PROCEDURE — 27110028 ZZH OR GENERAL SUPPLY NON-STERILE: Performed by: OBSTETRICS & GYNECOLOGY

## 2017-11-08 PROCEDURE — C1713 ANCHOR/SCREW BN/BN,TIS/BN: HCPCS | Performed by: OBSTETRICS & GYNECOLOGY

## 2017-11-08 PROCEDURE — S0020 INJECTION, BUPIVICAINE HYDRO: HCPCS | Performed by: OBSTETRICS & GYNECOLOGY

## 2017-11-08 PROCEDURE — 37000009 ZZH ANESTHESIA TECHNICAL FEE, EACH ADDTL 15 MIN: Performed by: OBSTETRICS & GYNECOLOGY

## 2017-11-08 PROCEDURE — 0UB70ZZ EXCISION OF BILATERAL FALLOPIAN TUBES, OPEN APPROACH: ICD-10-PCS | Performed by: OBSTETRICS & GYNECOLOGY

## 2017-11-08 PROCEDURE — 25000132 ZZH RX MED GY IP 250 OP 250 PS 637: Performed by: FAMILY MEDICINE

## 2017-11-08 PROCEDURE — 71000014 ZZH RECOVERY PHASE 1 LEVEL 2 FIRST HR: Performed by: OBSTETRICS & GYNECOLOGY

## 2017-11-08 PROCEDURE — 40000306 ZZH STATISTIC PRE PROC ASSESS II: Performed by: OBSTETRICS & GYNECOLOGY

## 2017-11-08 PROCEDURE — 25000128 H RX IP 250 OP 636: Performed by: NURSE ANESTHETIST, CERTIFIED REGISTERED

## 2017-11-08 PROCEDURE — 25000125 ZZHC RX 250: Performed by: OBSTETRICS & GYNECOLOGY

## 2017-11-08 PROCEDURE — 36000056 ZZH SURGERY LEVEL 3 1ST 30 MIN: Performed by: OBSTETRICS & GYNECOLOGY

## 2017-11-08 PROCEDURE — 27210794 ZZH OR GENERAL SUPPLY STERILE: Performed by: OBSTETRICS & GYNECOLOGY

## 2017-11-08 PROCEDURE — 25000128 H RX IP 250 OP 636: Performed by: OBSTETRICS & GYNECOLOGY

## 2017-11-08 PROCEDURE — 01999 UNLISTED ANES PROCEDURE: CPT | Performed by: NURSE ANESTHETIST, CERTIFIED REGISTERED

## 2017-11-08 PROCEDURE — 25000125 ZZHC RX 250: Performed by: NURSE ANESTHETIST, CERTIFIED REGISTERED

## 2017-11-08 PROCEDURE — 37000008 ZZH ANESTHESIA TECHNICAL FEE, 1ST 30 MIN: Performed by: OBSTETRICS & GYNECOLOGY

## 2017-11-08 PROCEDURE — 25000566 ZZH SEVOFLURANE, EA 15 MIN: Performed by: NURSE ANESTHETIST, CERTIFIED REGISTERED

## 2017-11-08 DEVICE — CLIP-FILSHIE: Type: IMPLANTABLE DEVICE | Site: FALLOPIAN TUBE | Status: FUNCTIONAL

## 2017-11-08 RX ORDER — GLYCOPYRROLATE 0.2 MG/ML
INJECTION, SOLUTION INTRAMUSCULAR; INTRAVENOUS PRN
Status: DISCONTINUED | OUTPATIENT
Start: 2017-11-08 | End: 2017-11-08

## 2017-11-08 RX ORDER — PROPOFOL 10 MG/ML
INJECTION, EMULSION INTRAVENOUS PRN
Status: DISCONTINUED | OUTPATIENT
Start: 2017-11-08 | End: 2017-11-08

## 2017-11-08 RX ORDER — FENTANYL CITRATE 50 UG/ML
25-50 INJECTION, SOLUTION INTRAMUSCULAR; INTRAVENOUS
Status: DISCONTINUED | OUTPATIENT
Start: 2017-11-08 | End: 2017-11-08 | Stop reason: HOSPADM

## 2017-11-08 RX ORDER — ONDANSETRON 2 MG/ML
4 INJECTION INTRAMUSCULAR; INTRAVENOUS EVERY 30 MIN PRN
Status: DISCONTINUED | OUTPATIENT
Start: 2017-11-08 | End: 2017-11-08 | Stop reason: HOSPADM

## 2017-11-08 RX ORDER — ONDANSETRON 2 MG/ML
INJECTION INTRAMUSCULAR; INTRAVENOUS PRN
Status: DISCONTINUED | OUTPATIENT
Start: 2017-11-08 | End: 2017-11-08

## 2017-11-08 RX ORDER — SODIUM CHLORIDE, SODIUM LACTATE, POTASSIUM CHLORIDE, CALCIUM CHLORIDE 600; 310; 30; 20 MG/100ML; MG/100ML; MG/100ML; MG/100ML
INJECTION, SOLUTION INTRAVENOUS CONTINUOUS
Status: DISCONTINUED | OUTPATIENT
Start: 2017-11-08 | End: 2017-11-08 | Stop reason: HOSPADM

## 2017-11-08 RX ORDER — ALBUTEROL SULFATE 0.83 MG/ML
2.5 SOLUTION RESPIRATORY (INHALATION) EVERY 4 HOURS PRN
Status: DISCONTINUED | OUTPATIENT
Start: 2017-11-08 | End: 2017-11-08 | Stop reason: HOSPADM

## 2017-11-08 RX ORDER — IBUPROFEN 800 MG/1
800 TABLET, FILM COATED ORAL EVERY 8 HOURS PRN
Qty: 40 TABLET | Refills: 1 | Status: SHIPPED | OUTPATIENT
Start: 2017-11-08

## 2017-11-08 RX ORDER — CEFAZOLIN SODIUM 2 G/100ML
2 INJECTION, SOLUTION INTRAVENOUS
Status: COMPLETED | OUTPATIENT
Start: 2017-11-08 | End: 2017-11-08

## 2017-11-08 RX ORDER — HYDROMORPHONE HYDROCHLORIDE 1 MG/ML
.3-.5 INJECTION, SOLUTION INTRAMUSCULAR; INTRAVENOUS; SUBCUTANEOUS EVERY 5 MIN PRN
Status: DISCONTINUED | OUTPATIENT
Start: 2017-11-08 | End: 2017-11-08 | Stop reason: HOSPADM

## 2017-11-08 RX ORDER — KETOROLAC TROMETHAMINE 30 MG/ML
30 INJECTION, SOLUTION INTRAMUSCULAR; INTRAVENOUS ONCE
Status: COMPLETED | OUTPATIENT
Start: 2017-11-08 | End: 2017-11-08

## 2017-11-08 RX ORDER — ONDANSETRON 4 MG/1
4 TABLET, ORALLY DISINTEGRATING ORAL EVERY 30 MIN PRN
Status: DISCONTINUED | OUTPATIENT
Start: 2017-11-08 | End: 2017-11-08 | Stop reason: HOSPADM

## 2017-11-08 RX ORDER — NEOSTIGMINE METHYLSULFATE 1 MG/ML
VIAL (ML) INJECTION PRN
Status: DISCONTINUED | OUTPATIENT
Start: 2017-11-08 | End: 2017-11-08

## 2017-11-08 RX ORDER — HYDROCODONE BITARTRATE AND ACETAMINOPHEN 5; 325 MG/1; MG/1
1-2 TABLET ORAL EVERY 6 HOURS PRN
Qty: 30 TABLET | Refills: 0 | Status: SHIPPED | OUTPATIENT
Start: 2017-11-08 | End: 2017-11-08

## 2017-11-08 RX ORDER — DEXAMETHASONE SODIUM PHOSPHATE 10 MG/ML
INJECTION, SOLUTION INTRAMUSCULAR; INTRAVENOUS PRN
Status: DISCONTINUED | OUTPATIENT
Start: 2017-11-08 | End: 2017-11-08

## 2017-11-08 RX ORDER — HYDROCODONE BITARTRATE AND ACETAMINOPHEN 5; 325 MG/1; MG/1
1-2 TABLET ORAL EVERY 6 HOURS PRN
Qty: 30 TABLET | Refills: 0 | Status: SHIPPED | OUTPATIENT
Start: 2017-11-08 | End: 2019-10-21

## 2017-11-08 RX ORDER — FENTANYL CITRATE 50 UG/ML
INJECTION, SOLUTION INTRAMUSCULAR; INTRAVENOUS PRN
Status: DISCONTINUED | OUTPATIENT
Start: 2017-11-08 | End: 2017-11-08

## 2017-11-08 RX ORDER — PHENYLEPHRINE HCL IN 0.9% NACL 1 MG/10 ML
SYRINGE (ML) INTRAVENOUS PRN
Status: DISCONTINUED | OUTPATIENT
Start: 2017-11-08 | End: 2017-11-08

## 2017-11-08 RX ORDER — SODIUM CHLORIDE, SODIUM LACTATE, POTASSIUM CHLORIDE, CALCIUM CHLORIDE 600; 310; 30; 20 MG/100ML; MG/100ML; MG/100ML; MG/100ML
INJECTION, SOLUTION INTRAVENOUS CONTINUOUS PRN
Status: DISCONTINUED | OUTPATIENT
Start: 2017-11-08 | End: 2017-11-08

## 2017-11-08 RX ORDER — LIDOCAINE HYDROCHLORIDE 20 MG/ML
INJECTION, SOLUTION INFILTRATION; PERINEURAL PRN
Status: DISCONTINUED | OUTPATIENT
Start: 2017-11-08 | End: 2017-11-08

## 2017-11-08 RX ORDER — CITRIC ACID/SODIUM CITRATE 334-500MG
30 SOLUTION, ORAL ORAL ONCE
Status: DISCONTINUED | OUTPATIENT
Start: 2017-11-08 | End: 2017-11-08 | Stop reason: HOSPADM

## 2017-11-08 RX ORDER — BUPIVACAINE HYDROCHLORIDE 2.5 MG/ML
INJECTION, SOLUTION INFILTRATION; PERINEURAL PRN
Status: DISCONTINUED | OUTPATIENT
Start: 2017-11-08 | End: 2017-11-08 | Stop reason: HOSPADM

## 2017-11-08 RX ORDER — NALOXONE HYDROCHLORIDE 0.4 MG/ML
.1-.4 INJECTION, SOLUTION INTRAMUSCULAR; INTRAVENOUS; SUBCUTANEOUS
Status: DISCONTINUED | OUTPATIENT
Start: 2017-11-08 | End: 2017-11-08 | Stop reason: HOSPADM

## 2017-11-08 RX ADMIN — ROCURONIUM BROMIDE 15 MG: 10 INJECTION INTRAVENOUS at 09:29

## 2017-11-08 RX ADMIN — CEFAZOLIN SODIUM 2 G: 2 INJECTION, SOLUTION INTRAVENOUS at 09:10

## 2017-11-08 RX ADMIN — KETOROLAC TROMETHAMINE 30 MG: 30 INJECTION, SOLUTION INTRAMUSCULAR at 09:43

## 2017-11-08 RX ADMIN — Medication 100 MG: at 09:23

## 2017-11-08 RX ADMIN — LIDOCAINE HYDROCHLORIDE 40 MG: 20 INJECTION, SOLUTION INFILTRATION; PERINEURAL at 09:23

## 2017-11-08 RX ADMIN — FENTANYL CITRATE 25 MCG: 50 INJECTION, SOLUTION INTRAMUSCULAR; INTRAVENOUS at 09:35

## 2017-11-08 RX ADMIN — ROCURONIUM BROMIDE 5 MG: 10 INJECTION INTRAVENOUS at 09:23

## 2017-11-08 RX ADMIN — SODIUM CHLORIDE, POTASSIUM CHLORIDE, SODIUM LACTATE AND CALCIUM CHLORIDE: 600; 310; 30; 20 INJECTION, SOLUTION INTRAVENOUS at 08:36

## 2017-11-08 RX ADMIN — FENTANYL CITRATE 25 MCG: 50 INJECTION, SOLUTION INTRAMUSCULAR; INTRAVENOUS at 09:23

## 2017-11-08 RX ADMIN — ONDANSETRON 4 MG: 2 INJECTION INTRAMUSCULAR; INTRAVENOUS at 09:30

## 2017-11-08 RX ADMIN — DEXAMETHASONE SODIUM PHOSPHATE 10 MG: 10 INJECTION, SOLUTION INTRAMUSCULAR; INTRAVENOUS at 09:30

## 2017-11-08 RX ADMIN — GLYCOPYRROLATE 0.2 MG: 0.2 INJECTION, SOLUTION INTRAMUSCULAR; INTRAVENOUS at 09:54

## 2017-11-08 RX ADMIN — IBUPROFEN 800 MG: 400 TABLET ORAL at 15:50

## 2017-11-08 RX ADMIN — PROPOFOL 200 MG: 10 INJECTION, EMULSION INTRAVENOUS at 09:23

## 2017-11-08 RX ADMIN — FENTANYL CITRATE 50 MCG: 50 INJECTION, SOLUTION INTRAMUSCULAR; INTRAVENOUS at 09:32

## 2017-11-08 RX ADMIN — Medication 50 MCG: at 09:43

## 2017-11-08 RX ADMIN — Medication 2 MG: at 09:54

## 2017-11-08 NOTE — PLAN OF CARE
Face to face report given with opportunity to observe patient.    Report given to Bri Rios RN & Maria Del Carmen ANDREWS   11/8/2017  7:22 AM

## 2017-11-08 NOTE — ANESTHESIA PREPROCEDURE EVALUATION
Anesthesia Evaluation     . Pt has not had prior anesthetic            ROS/MED HX    ENT/Pulmonary:     (+)ONUR risk factors snores loudly, , . .    Neurologic:  - neg neurologic ROS     Cardiovascular:  - neg cardiovascular ROS       METS/Exercise Tolerance:  >4 METS   Hematologic:  - neg hematologic  ROS       Musculoskeletal:  - neg musculoskeletal ROS      (-) musculoskeletal other   GI/Hepatic:     (+) Other GI/Hepatic abdominal pain,      Renal/Genitourinary:  - ROS Renal section negative       Endo: Comment: BMI 37.11    (+) Obesity, .      Psychiatric:  - neg psychiatric ROS       Infectious Disease:  - neg infectious disease ROS       Malignancy:      - no malignancy   Other:    - neg other ROS                 Physical Exam  Normal systems: cardiovascular and pulmonary    Airway   Mallampati: IV  TM distance: >3 FB  Neck ROM: full    Dental   (+) other  Comment: poor    Cardiovascular   Rhythm and rate: regular and normal      Pulmonary    breath sounds clear to auscultation                    Anesthesia Plan      History & Physical Review  History and physical reviewed and following examination; no interval change.    ASA Status:  2 .    NPO Status:  > 8 hours    Plan for General, RSI and ETT with Propofol and Intravenous induction. Maintenance will be Inhalation and Balanced.    PONV prophylaxis:  Ondansetron (or other 5HT-3) and Dexamethasone or Solumedrol  Discussed risks and benefits with patient for general anesthesia including sore throat, nausea, vomiting, aspiration, dental damage, loss of airway, CV complications, stroke, MI, death. Pt wishes to proceed.       Postoperative Care  Postoperative pain management:  IV analgesics.      Consents  Anesthetic plan, risks, benefits and alternatives discussed with:  Patient..                          .

## 2017-11-08 NOTE — ANESTHESIA CARE TRANSFER NOTE
Patient: Sarina Vila    Procedure(s):  POST PARTUM MINI LAP BILATERAL TUBAL OCCLUSION - Wound Class: II-Clean Contaminated    Diagnosis: REQUEST STERILIZATION  Diagnosis Additional Information: No value filed.    Anesthesia Type:   General, RSI, ETT     Note:  Airway :Nasal Cannula  Patient transferred to:PACU  Handoff Report: Identifed the Patient, Identified the Reponsible Provider, Reviewed the pertinent medical history, Discussed the surgical course, Reviewed Intra-OP anesthesia mangement and issues during anesthesia, Set expectations for post-procedure period and Allowed opportunity for questions and acknowledgement of understanding      Vitals: (Last set prior to Anesthesia Care Transfer)    CRNA VITALS  11/8/2017 0936 - 11/8/2017 1009      11/8/2017             Resp Rate (observed): (!)  7    Resp Rate (set): 8                Electronically Signed By: ANNI Gonzalez CRNA  November 8, 2017  10:09 AM

## 2017-11-08 NOTE — OP NOTE
[facility] Operative Note:    Pre-operative diagnosis: Desires sterilization  Post-operative diagnosis: Same  Procedure: Minilaparotomy, Bilateral tubal occlusion (Filshie clips)  Surgeon: Karthik Epperson MD  Assistant(s): None  Anesthesia: General Laryngeal Mask Airway Anesthesia  Estimated blood loss: Minimal  Total IV fluids: (See anesthesia record)  Blood transfusion: No transfusion was given during surgery  Drains: None  Specimens: None  Findings: Normal tubal anatomy  Complications:None  Condition: Mother stable, transfered to post-anesthesia recovery    Procedure Details:  The risks, benefits, complications, treatment options, and expected outcomes were discussed with the patient.  The patient concurred with the proposed plan, giving informed consent.   The patient was taken to Operating Room,  identity confirmed and the procedure verified.  A Time Out was held and the above information confirmed.    DESCRIPTION OF PROCEDURE:  The patient was brought to the operating room and uneventfully placed under general anesthesia.  She was prepped and draped in the supine position and her bladder drained.  A semilunar infraumbilical skin incision was performed with a scalpel.  The dissection was continued to the abdominal fascia which was incised vertically.  The peritoneum was identified and entered without difficulty.  The abdominal fascia was tagged on each side with 0 Vicryl sutures.  The right fallopian tube was identified to its distal end.  A proximal avascular portion of the tube was then completely occluded with a single Filshie clip.  The same procedure was repeated on the patient's left side.  At this point, there was excellent hemostasis, so we proceeded to closure.  The instruments were removed and sponge and counts were correct.  The abdominal fascia was closed with interrupted 0 Vicryl sutures.  The subcutaneous space was irrigated and checked for hemostasis.  The fascia and skin were injected with  0.25% Marcaine.  The skin was closed with a subcuticular 4-0 Monocryl stitch and the skin incisions covered with surgical glue.  There were no complications and the patient was transferred to the recovery room in excellent stable condition.         MARIA G RO MD          Instrument, sponge, and needle counts were correct prior the abdominal closure and at the conclusion of the case.         Maria G Ro MD

## 2017-11-08 NOTE — PLAN OF CARE
Patient discharged at 4:31 PM via ambulation accompanied by significant other and staff. Prescriptions sent with patient to fill . All belongings sent with patient.     Discharge instructions reviewed with patient. Listed belongings gathered and returned to patient. yes    Patient discharged to home.     Core Measures and Vaccines  Core Measures applicable during stay: no  Pneumonia and Influenza given prior to discharge, if indicated: No    Surgical Patient   Surgical Procedures during stay: tubal  Did patient receive discharge instruction on wound care and recognition of infection symptoms? Yes    MISC  Follow up appointment made:  Yes  Home and hospital aquired medications returned to patient: N/A  Patient reports pain was well managed at discharge: Yes

## 2017-11-08 NOTE — PLAN OF CARE
Problem: Patient Care Overview  Goal: Plan of Care/Patient Progress Review  Outcome: Improving  Assessments completed as charted. B/P: 123/63, T: 98.3, P: 74, R: 16. Rates pain: 2/10, reports adequate pain control. Voiding without difficulty. Fundus: Midline firm, U-3. Lochia: Light. Activity: unrestricted with out pain  and movement slightly limited due to surgical pain, from PPTL. Infant feeding: Breast feeding going well.      LATCH Score:   Latch: 2 - Good Latch  Audible Swallowin - Spontaneous & frequent  Type of Nipple: (Breast/Nipple) 1  Comfort: 2 - Soft, Nontender  Hold: 2 - No Assist   Total LATCH Score: 9     Postpartum breastfeeding assessment completed and education provided, see Patient Education Activity.  Items included in the education are:     proper positioning and latch    effectiveness of feeding    manual expression    handling and storing breastmilk    maintenance of breastfeeding for the first 6 months    sign/symptoms of infant feeding issues requiring referral to qualified health care provider  Postpartum care education provided, see Patient Education activity. Patient denies needs. Will monitor.  Yasemin Rios

## 2017-11-08 NOTE — PLAN OF CARE
Pt NPO.  Hibbiclens shower this am.  Elijah wipes to armpits, abdomen and vagina prior to pt going to pre-op.  Pre-op checklist completed, pt voided just prior to leaving unit.   Pt on cart and taken to pre-op at 0815 report called to Kaitlin.  Will await pt's return.

## 2017-11-08 NOTE — PROGRESS NOTES
[facility] Obstetrics Post-Partum Progress Note          Assessment and Plan:    Assessment:   Post-partum day #2  Normal spontaneous vaginal delivery  Undergoing tubal ligation this morning  L&D complications: None      Doing well.      Plan:   Home later today. Down for tubal ligation this am.           Interval History:   Doing well.  Pain is well-controlled.  No fevers.  No history of foul-smelling vaginal discharge.  Good appetite.  Denies chest pain, shortness of breath, nausea or vomiting.  Vaginal bleeding is similar to a heavy menstrual flow.  Ambulatory.  Breastfeeding well.            Significant Problems:    None          Review of Systems:    The patient denies any chest pain, shortness of breath, excessive pain, fever, chills, foul smelling lochia, nausea or vomiting.          Medications:   All medications related to the patient's surgery have been reviewed          Physical Exam:   All vitals stable  Uterine fundus is firm, non-tender and at the level of the umbilicus          Data:     Lab Results   Component Value Date    WBC 9.3 11/07/2017    HGB 9.4 (L) 11/07/2017    HCT 28.0 (L) 11/07/2017     11/07/2017     No imaging studies have been ordered  Marcellus Martinez MD

## 2017-11-08 NOTE — LACTATION NOTE
"Initial Lactation Consultation    Sarina Kellysariah                                                                                                    [id number]    Consultation Date: 2017    Reason for Lactation Referral:routine lactation assessment.    MATERNAL HISTORY   Maternal History: 2nd baby, vaginal delivery  History of Breast Surgery: No  Breast Changes During Pregnancy: Yes  Breast Feeding History: Yes,  unsuccessful, Length of Time: 1 week  Maternal Meds: see eMar    MATERNAL ASSESSMENT    Breast Size: average  Nipple Appearance - Left: intact  Nipple Appearance - Right: intact  Nipple Erectility - Left: erect with stimulation  Nipple Erectility - Right: erect with stimulation  Areolas Compressibility: soft  Nipple Size: average  Milk Supply: colostrum    INFANT ASSESSMENT      Oral Anatomy  Mouth: normal  Palate: normal  Jaw: normal  Tongue: normal    FEEDING   Feeding Time:1400  Position: right breast, cradle  Effort to Latch: awake and alert, latched easily  Results: excellent breast feed    FEEDING PLAN    Home Feeding Plan: Nurse on demand, responding to infant's feeding cues. Snuggle in skin-to-skin to learn positioning and infant cues. Rooming-in encouraged.    LACTATION COMMENTS   Anticipatory guidance provided in regard to \"baby's second night.\"      Deep latch explained for proper positioning of breast in infant's mouth, maximizing milk transfer and comfort.   signs of satiety reviewed.  \"Ways to know that baby is getting enough\" discussed thoroughly.  Follow-up support information provided.    __________________________________________________________________________________  ERI CHAVEZ RN IBCLC  2017      "

## 2017-11-08 NOTE — ANESTHESIA POSTPROCEDURE EVALUATION
Patient: Sarina Vila    Procedure(s):  POST PARTUM MINI LAP BILATERAL TUBAL OCCLUSION - Wound Class: II-Clean Contaminated    Diagnosis:REQUEST STERILIZATION  Diagnosis Additional Information: No value filed.    Anesthesia Type:  General, RSI, ETT    Note:  Anesthesia Post Evaluation    Patient location during evaluation: Floor  Patient participation: Able to fully participate in evaluation  Level of consciousness: awake  Pain management: adequate  Airway patency: patent  Cardiovascular status: acceptable  Respiratory status: acceptable  Hydration status: acceptable  PONV: none     Anesthetic complications: None          Last vitals:  Vitals:    11/08/17 1149 11/08/17 1150 11/08/17 1151   BP:  134/88    Pulse:      Resp:      Temp:      SpO2: 97% 96% 96%         Electronically Signed By: ANNI Gonzalez CRNA  November 8, 2017  1:12 PM

## 2017-11-08 NOTE — PLAN OF CARE
Pt alert and oriented with no noted issues at this time. Stable condition noted. Will continue to monitor.

## 2017-11-08 NOTE — PLAN OF CARE
Pt back from the PACU at 1050, report to Leonora PENA RN, report obtained from Leonora GONZALEZ, assumed care of pt.  Pt groggy, resting in bed, feeding baby.  Vitals stable, incision c/d/i, scant vaginal bleeding.   Will continue to monitor pt and provide cares as needed.

## 2017-11-08 NOTE — PLAN OF CARE
Face to face report given with opportunity to observe patient.    Report given to Sarah Perez   11/7/2017  11:22 PM

## 2017-11-08 NOTE — DISCHARGE INSTRUCTIONS
No driving today or while on pain meds  Pelvic rest for 4 weeks  Call Dr. Epperson [phone] as necessary if problems in interim, no post op appt needed.  No heavy lifting, vigorous activity, swim, bath, exercise for 2 weeks    Appointment at [facility] with Dr. Maritnez on Wednesday,12/20/17, at 10:30 AM!     Postpartum Vaginal Delivery Instructions    Activity    Ask family and friends for help when you need it.  Do not place anything in your vagina until approved by your Physician .  You are not restricted on other activities, but take it easy for a few weeks to allow your body to recover from delivery.  You are able to do any activities you feel up to that point.  No driving until you have stopped taking narcotic pain medications.    Call your health care provider if you have any of these symptoms:    Increased pain, swelling, redness, or fluid around your stiches from an episiotomy or perineal tear.  A fever above 100.4 F (38 C) with or without chills when placing a thermometer under your tongue.  You soak a sanitary pad with blood within 1 hour, or you see blood clots larger than a golf ball.  Bleeding that lasts more than 6 weeks.  Vaginal discharge that smells bad.  Severe pain, cramping or tenderness in your lower belly area.  A need to urinate more frequently (use the toilet more often), more urgently (use the toilet very quickly), or it burns when you urinate.  Nausea and vomiting.  Redness, swelling or pain around a vein in your leg.  Problems breastfeeding or a red or painful area on your breast.  Chest pain and cough or are gasping for air.  Problems coping with sadness, anxiety, or depression.  If you have any concerns about hurting yourself or the baby, call your provider immediately. The Safe Place for Newborns law allows you to bring your baby to the hospital up to 7 days, no questions asked.  You have questions or concerns after you return home.    Keep your hands clean:  Always wash  your hands before touching your perineal area and stitches.  This helps reduce your risk of infection.  If your hands aren't dirty, you may use an alcohol hand-rub to clean your hands. Keep your nails clean and short.

## 2017-11-08 NOTE — PLAN OF CARE
Pt reported urge to void.  Pt up to the bathroom is standby assist of 2, steady on her feet.  Pt able to void.  Scant vaginal bleeding.  Pt tolerating juice and crackers, pt encouraged to order lunch.  Pt aware to call with needs.

## 2017-11-10 NOTE — DISCHARGE SUMMARY
[facility] Obstetrics Post-Partum Progress Note          Assessment and Plan:    Assessment:   Post-partum day #2  Normal spontaneous vaginal delivery  Undergoing tubal ligation this morning  L&D complications: None      Doing well.      Plan:   Home later today. Down for tubal ligation this am.           Interval History:   Doing well.  Pain is well-controlled.  No fevers.  No history of foul-smelling vaginal discharge.  Good appetite.  Denies chest pain, shortness of breath, nausea or vomiting.  Vaginal bleeding is similar to a heavy menstrual flow.  Ambulatory.  Breastfeeding well.            Significant Problems:    None          Review of Systems:    The patient denies any chest pain, shortness of breath, excessive pain, fever, chills, foul smelling lochia, nausea or vomiting.          Medications:   All medications related to the patient's surgery have been reviewed          Physical Exam:   All vitals stable  Uterine fundus is firm, non-tender and at the level of the umbilicus          Data:     Lab Results   Component Value Date    WBC 9.3 11/07/2017    HGB 9.4 (L) 11/07/2017    HCT 28.0 (L) 11/07/2017     11/07/2017     No imaging studies have been ordered  Marcellus Martinez MD

## 2017-11-15 ENCOUNTER — TELEPHONE (OUTPATIENT)
Dept: OBGYN | Facility: HOSPITAL | Age: 27
End: 2017-11-15
Payer: COMMERCIAL

## 2017-11-15 NOTE — TELEPHONE ENCOUNTER
"1. How was your birth experience at [facility]?  Great  2. I see when you were discharged from the hospital that you were breast feeding your baby. If breastfeeding, are you still exclusively breastfeeding?  yes  3. Before your baby was born, were you educated on breastfeeding?  yes  4. Did you and your baby have skin to skin contact immediately after birth?  yes  5. Were you able to offer your baby the breast for the first time within 1 hour after birth?  yes  6. Was the staff supportive and did they educate you on breastfeeding? (Example: Feeding on demand, breastfeeding without supplementation, no pacifiers, checking baby's latch, etc.)  yes  7. Did your baby room-in with you during your hospital stay?  yes  8. Were you told how to contact someone for breastfeeding support or how to make an outpatient lactation appointment if not already done for you?  yes  9. Overall, were you happy with your experience with infant feeding and bonding with your baby?  yes  10. Did you completely understand your discharge instructions prior to leaving the hospital?  yes  11. We always want to provide exceptional care. How was your overall care?  \"Great\"  12. Is there anything we could have done differently to meet your needs?  no  13. Is there anything I can do before I go?  No    Thank you for your time today and for choosing [facility] for your care. We wish you and your family good health and much happiness.     Angela Thapa  November 15, 2017    "

## 2017-12-15 NOTE — IP AVS SNAPSHOT
MRN:[id number]                      After Visit Summary   10/5/2017    Sarina Vila    MRN: [id number]           Thank you!     Thank you for choosing [facility] for your care. Our goal is always to provide you with excellent care. Hearing back from our patients is one way we can continue to improve our services. Please take a few minutes to complete the written survey that you may receive in the mail after you visit with us. Thank you!        Patient Information     Date Of Birth          1990        About your hospital stay     You were admitted on:  October 5, 2017 You last received care in the:  [facility]    You were discharged on:  October 5, 2017       Who to Call     For medical emergencies, please call 911.  For non-urgent questions about your medical care, please call your primary care provider or clinic, [phone]          Attending Provider     Provider Specialty    Marcellus Martinez MD Family Practice       Primary Care Provider Office Phone # Fax #    Marcellus Martinez [phone]      Further instructions from your care team       Discharge Instructions for Undelivered Patients    Diet:  * Drink 8 to 12 glasses of liquids (milk, juice, water) every day  * You may eat meals and snacks.    Activity:  * Count fetal kicks every day.  * Call your doctor if your baby is moving less than usual.    Call your provider if you notice:  * Swelling in your face or increased swelling in your hands or legs.  * Headaches that are not relieved by Tylenol (acetaminophen).  * Changes in your vision (blurring; seeing spots or stars).  * Nausea (sick to your stomach) and vomiting (throwing up).  * Weight gain of 5 pounds per week.  * Heartburn that doesn't go away.  * Signs of bladder infection: Pain when you urinate (use the toilet), needing to go more often or more urgently.  * The bag of fisher (membrane) breaks, or you notice leaking in your underwear.  * Bright red  "blood in your underwear.  * Abdominal (lower belly) or stomach pain.  * For first baby: Contractions (tightenings) less than 5 minutes apart for one hour or more.  * Second (plus) baby: Contractions (tightenings) less than 10 minutes apart and getting stronger.  * Increase or change in vaginal discharge (note the color and amount).        [facility]: [phone]        Pending Results     No orders found from 10/3/2017 to 10/6/2017.            Admission Information     Date & Time Provider Department Dept. Phone    10/5/2017 Marcellus Martinez MD [facility] [phone]      Your Vitals Were     Blood Pressure Pulse Temperature Respirations Height Weight    102/53 76 97.7  F (36.5  C) (Oral) 16 1.549 m (5' 1\") 88 kg (194 lb)    Pulse Oximetry BMI (Body Mass Index)                99% 36.66 kg/m2          MyChart Information     [employer]! lets you send messages to your doctor, view your test results, renew your prescriptions, schedule appointments and more. To sign up, go to [email or web address] . Click on \"Log in\" on the left side of the screen, which will take you to the Welcome page. Then click on \"Sign up Now\" on the right side of the page.     You will be asked to enter the access code listed below, as well as some personal information. Please follow the directions to create your username and password.     Your access code is: [email or web address]  Expires: 1/3/2018  7:41 PM     Your access code will  in 90 days. If you need help or a new code, please call your [facility] clinic or [phone].        Care EveryWhere ID     This is your Care EveryWhere ID. This could be used by other organizations to access your [facility] medical records  [id number]        Equal Access to Services     [facility] VIMAL : Anel Blake, aly boucher, qagennarota kadeangelo galdamez. So [facility] [phone].    ATENCIÓN: Si habla español, tiene a kay " disposición servicios gratuitos de asistencia lingüística. Sharyn gandhi [phone].    We comply with applicable federal civil rights laws and [place] laws. We do not discriminate on the basis of race, color, national origin, age, disability, sex, sexual orientation, or gender identity.               Review of your medicines      UNREVIEWED medicines. Ask your doctor about these medicines        Dose / Directions    [employer] PRENATAL GUMMY 0.4-113.5 MG Chew        Dose:  2 each   Take 2 each by mouth daily   Refills:  0                Protect others around you: Learn how to safely use, store and throw away your medicines at www.disposemymeds.org.             Medication List: This is a list of all your medications and when to take them. Check marks below indicate your daily home schedule. Keep this list as a reference.      Medications           Morning Afternoon Evening Bedtime As Needed    [employer] PRENATAL GUMMY 0.4-113.5 MG Chew   Take 2 each by mouth daily                                   no

## 2018-04-06 ENCOUNTER — APPOINTMENT (OUTPATIENT)
Dept: GENERAL RADIOLOGY | Facility: HOSPITAL | Age: 28
End: 2018-04-06
Attending: NURSE PRACTITIONER
Payer: COMMERCIAL

## 2018-04-06 ENCOUNTER — HOSPITAL ENCOUNTER (EMERGENCY)
Facility: HOSPITAL | Age: 28
Discharge: HOME | End: 2018-04-06
Attending: NURSE PRACTITIONER | Admitting: NURSE PRACTITIONER
Payer: COMMERCIAL

## 2018-04-06 VITALS
HEART RATE: 76 BPM | OXYGEN SATURATION: 100 % | TEMPERATURE: 98 F | RESPIRATION RATE: 16 BRPM | DIASTOLIC BLOOD PRESSURE: 92 MMHG | SYSTOLIC BLOOD PRESSURE: 149 MMHG

## 2018-04-06 DIAGNOSIS — S62.346A CLOSED NONDISPLACED FRACTURE OF BASE OF FIFTH METACARPAL BONE OF RIGHT HAND, INITIAL ENCOUNTER: ICD-10-CM

## 2018-04-06 PROCEDURE — 99203 OFFICE O/P NEW LOW 30 MIN: CPT | Performed by: NURSE PRACTITIONER

## 2018-04-06 PROCEDURE — 73130 X-RAY EXAM OF HAND: CPT | Mod: TC,RT

## 2018-04-06 PROCEDURE — 73090 X-RAY EXAM OF FOREARM: CPT | Mod: TC,RT

## 2018-04-06 PROCEDURE — G0463 HOSPITAL OUTPT CLINIC VISIT: HCPCS

## 2018-04-06 ASSESSMENT — ENCOUNTER SYMPTOMS
COUGH: 0
TROUBLE SWALLOWING: 0
DYSURIA: 0
ACTIVITY CHANGE: 0
FEVER: 0
APPETITE CHANGE: 0
WEAKNESS: 0
NUMBNESS: 0
PSYCHIATRIC NEGATIVE: 1
NAUSEA: 0

## 2018-04-06 NOTE — ED PROVIDER NOTES
History     Chief Complaint   Patient presents with     Hand Pain     co pain in her lateral area right hand and her little finger.  tripped over salomón toy a couple of hrs ago.  cms intact.  had ice on it     The history is provided by the patient. No  was used.     Sarina Vila is a 28 year old female who presents with right hand pain that started today. She tripped over her kids toys and landed on her right hand. Denies other injuries. She's applied ice to right hand with mild effectiveness. Eating and drinking well. Bowel and bladder are working well. Denies previous right hand injury. She is right hand dominant.       Problem List:    Patient Active Problem List    Diagnosis Date Noted     Postpartum care following vaginal delivery 11/08/2017     Priority: Medium     Encounter for sterilization 05/23/2017     Priority: Medium      Consult done Dr. Epperson.  Federal sterilization request forms will need to be reviewed reviewed and signed after 20 weeks. Pt instructed to do this with Dr. Martinez.         Cervical intraepithelial neoplasia grade 2 03/11/2016     Priority: Medium        Past Medical History:    History reviewed. No pertinent past medical history.    Past Surgical History:    Past Surgical History:   Procedure Laterality Date     TUBAL OCCLUSION Bilateral 11/8/2017    Procedure: COMBINED LAPAROTOMY MINI, TUBAL OCCLUSION;  POST PARTUM MINI LAP BILATERAL TUBAL OCCLUSION;  Surgeon: Karthik Epperson MD;  Location: [facility]       Family History:    No family history on file.    Social History:  Marital Status:  Single [1]  Social History   Substance Use Topics     Smoking status: Never Smoker     Smokeless tobacco: Never Used     Alcohol use No        Medications:      ibuprofen (ADVIL/MOTRIN) 800 MG tablet   HYDROcodone-acetaminophen (NORCO) 5-325 MG per tablet         Review of Systems   Constitutional: Negative for activity change, appetite change and fever.   HENT: Negative for  trouble swallowing.    Respiratory: Negative for cough.    Gastrointestinal: Negative for nausea.   Genitourinary: Negative for dysuria.   Musculoskeletal:        Right hand pain.    Skin: Negative for rash.   Neurological: Negative for weakness and numbness.   Psychiatric/Behavioral: Negative.        Physical Exam   BP: 149/92  Pulse: 76  Temp: 98  F (36.7  C)  Resp: 16  SpO2: 100 %      Physical Exam   Constitutional: She is oriented to person, place, and time. She appears well-developed and well-nourished. No distress.   HENT:   Head: Normocephalic.   Mouth/Throat: Oropharynx is clear and moist.   Neck: Normal range of motion. Neck supple.   Cardiovascular: Normal rate, regular rhythm, normal heart sounds and intact distal pulses.    No murmur heard.  Pulmonary/Chest: Effort normal. No respiratory distress. She has no wheezes. She has no rales.   Abdominal: Soft. She exhibits no distension.   Musculoskeletal: She exhibits tenderness. She exhibits no edema or deformity.   CMS and ROM intact to right upper extremity. Right radial pulse +2. Extension and flexion intact to all digits on right hand. Tenderness and swelling to distal 5th metacarpal on right hand.    Neurological: She is alert and oriented to person, place, and time. She exhibits normal muscle tone.   Skin: Skin is warm and dry. No rash noted. She is not diaphoretic.   Psychiatric: She has a normal mood and affect. Her behavior is normal.   Nursing note and vitals reviewed.      ED Course     ED Course     Procedures    I personally reviewed the x-rays and there is slight 5th metacarpal fracture. NO dislocation. Radiology review pending and nurse will notify patient if there is any change in the treatment plan.    Pre-fabricated boxer splint applied to right hand. Tolerated well.     Assessments & Plan (with Medical Decision Making)     Discussed plan of care. She verbalized understanding. All questions answered.     I have reviewed the nursing  notes.    I have reviewed the findings, diagnosis, plan and need for follow up with the patient.  Discharged in stable condition.     New Prescriptions    No medications on file       Final diagnoses:   Closed nondisplaced fracture of base of fifth metacarpal bone of right hand, initial encounter     Wear splint until follow up with PCP.   Take tylenol and/or ibuprofen for pain. Follow dosing on package.   See RICE handout.   Elevate right hand as much as able.   Apply ice to right hand for 20 minutes every 1-2 hours while awake. Protect skin.   Follow up with PCP in 10 days for re-check.   Return to urgent care or emergency department with any increase in symptoms or concerns.     CEASAR Campos  4/6/2018  6:42 PM  UR[facility]ARE CLINIC       Selma Brown NP  04/08/18 1114

## 2018-04-06 NOTE — ED AVS SNAPSHOT
[facility]    [address] [address]    Phone:  [phone]                                       Sarina Vila   MRN: [id number]    Department:  [facility]   Date of Visit:  4/6/2018           After Visit Summary Signature Page     I have received my discharge instructions, and my questions have been answered. I have discussed any challenges I see with this plan with the nurse or doctor.    ..........................................................................................................................................  Patient/Patient Representative Signature      ..........................................................................................................................................  Patient Representative Print Name and Relationship to Patient    ..................................................               ................................................  Date                                            Time    ..........................................................................................................................................  Reviewed by Signature/Title    ...................................................              ..............................................  Date                                                            Time

## 2018-04-07 NOTE — DISCHARGE INSTRUCTIONS
Wear splint until follow up with PCP.   Take tylenol and/or ibuprofen for pain. Follow dosing on package.   See RICE handout.   Elevate right hand as much as able.   Apply ice to right hand for 20 minutes every 1-2 hours while awake. Protect skin.   Follow up with PCP in 10 days for re-check.   Return to urgent care or emergency department with any increase in symptoms or concerns.

## 2019-10-21 ENCOUNTER — APPOINTMENT (OUTPATIENT)
Dept: GENERAL RADIOLOGY | Facility: HOSPITAL | Age: 29
End: 2019-10-21
Attending: NURSE PRACTITIONER
Payer: COMMERCIAL

## 2019-10-21 ENCOUNTER — HOSPITAL ENCOUNTER (EMERGENCY)
Facility: HOSPITAL | Age: 29
Discharge: HOME | End: 2019-10-21
Attending: NURSE PRACTITIONER | Admitting: NURSE PRACTITIONER
Payer: COMMERCIAL

## 2019-10-21 VITALS — DIASTOLIC BLOOD PRESSURE: 88 MMHG | OXYGEN SATURATION: 96 % | TEMPERATURE: 101.3 F | SYSTOLIC BLOOD PRESSURE: 126 MMHG

## 2019-10-21 DIAGNOSIS — J18.9 RLL PNEUMONIA: ICD-10-CM

## 2019-10-21 DIAGNOSIS — J02.0 STREP THROAT: ICD-10-CM

## 2019-10-21 LAB
DEPRECATED S PYO AG THROAT QL EIA: ABNORMAL
SPECIMEN SOURCE: ABNORMAL

## 2019-10-21 PROCEDURE — 25000132 ZZH RX MED GY IP 250 OP 250 PS 637: Performed by: NURSE PRACTITIONER

## 2019-10-21 PROCEDURE — G0463 HOSPITAL OUTPT CLINIC VISIT: HCPCS | Mod: 25

## 2019-10-21 PROCEDURE — 87880 STREP A ASSAY W/OPTIC: CPT | Performed by: NURSE PRACTITIONER

## 2019-10-21 PROCEDURE — 71046 X-RAY EXAM CHEST 2 VIEWS: CPT | Mod: TC

## 2019-10-21 PROCEDURE — 99213 OFFICE O/P EST LOW 20 MIN: CPT | Mod: Z6 | Performed by: NURSE PRACTITIONER

## 2019-10-21 RX ORDER — ACETAMINOPHEN 325 MG/1
1000 TABLET ORAL ONCE
Status: COMPLETED | OUTPATIENT
Start: 2019-10-21 | End: 2019-10-21

## 2019-10-21 RX ORDER — AZITHROMYCIN 250 MG/1
500 TABLET, FILM COATED ORAL ONCE
Status: COMPLETED | OUTPATIENT
Start: 2019-10-21 | End: 2019-10-21

## 2019-10-21 RX ORDER — IBUPROFEN 800 MG/1
800 TABLET, FILM COATED ORAL ONCE
Status: COMPLETED | OUTPATIENT
Start: 2019-10-21 | End: 2019-10-21

## 2019-10-21 RX ORDER — AZITHROMYCIN 250 MG/1
TABLET, FILM COATED ORAL
Qty: 4 TABLET | Refills: 0 | Status: SHIPPED | OUTPATIENT
Start: 2019-10-21 | End: 2019-10-26

## 2019-10-21 RX ADMIN — IBUPROFEN 800 MG: 800 TABLET ORAL at 18:57

## 2019-10-21 RX ADMIN — AZITHROMYCIN 500 MG: 250 TABLET, FILM COATED ORAL at 20:20

## 2019-10-21 RX ADMIN — ACETAMINOPHEN 975 MG: 325 TABLET, FILM COATED ORAL at 19:59

## 2019-10-21 ASSESSMENT — ENCOUNTER SYMPTOMS
ENDOCRINE NEGATIVE: 1
PSYCHIATRIC NEGATIVE: 1
FATIGUE: 1
CHILLS: 1
GASTROINTESTINAL NEGATIVE: 1
SINUS PAIN: 1
MUSCULOSKELETAL NEGATIVE: 1
CHEST TIGHTNESS: 1
HEADACHES: 1
COUGH: 1
FEVER: 1
SORE THROAT: 1

## 2019-10-21 NOTE — ED PROVIDER NOTES
History     Chief Complaint   Patient presents with     Cough     for a week     Fever     101 fever at home at 1630. Taking Tylenol and Advil around 0900     HPI  Sarina Vila is a 29 year old female who presents for cough body aches chills congestion and sore throat.  States symptoms started as a mild upper respiratory type infection a week ago this morning she felt worse and today she has a temperature of 101.3.  She denies nausea vomiting states her cough is intermittently productive but she does have significant pleuritic pain when she coughs.    Allergies:  Allergies   Allergen Reactions     Clindamycin Nausea and Vomiting       Problem List:    Patient Active Problem List    Diagnosis Date Noted     Postpartum care following vaginal delivery 11/08/2017     Priority: Medium     Encounter for sterilization 05/23/2017     Priority: Medium      Consult done Dr. Epperson.  Federal sterilization request forms will need to be reviewed reviewed and signed after 20 weeks. Pt instructed to do this with Dr. Martinez.         Cervical intraepithelial neoplasia grade 2 03/11/2016     Priority: Medium        Past Medical History:    No past medical history on file.    Past Surgical History:    Past Surgical History:   Procedure Laterality Date     TUBAL OCCLUSION Bilateral 11/8/2017    Procedure: COMBINED LAPAROTOMY MINI, TUBAL OCCLUSION;  POST PARTUM MINI LAP BILATERAL TUBAL OCCLUSION;  Surgeon: Karthik Epperson MD;  Location: [facility] OR       Family History:    No family history on file.    Social History:  Marital Status:  Single [1]  Social History     Tobacco Use     Smoking status: Never Smoker     Smokeless tobacco: Never Used   Substance Use Topics     Alcohol use: No     Drug use: No        Medications:    azithromycin (ZITHROMAX Z-LASHON) 250 MG tablet  ibuprofen (ADVIL/MOTRIN) 800 MG tablet          Review of Systems   Constitutional: Positive for chills, fatigue and fever.   HENT: Positive for congestion, sinus pain and  sore throat.    Respiratory: Positive for cough and chest tightness.    Gastrointestinal: Negative.    Endocrine: Negative.    Genitourinary: Negative.    Musculoskeletal: Negative.    Skin: Negative.    Neurological: Positive for headaches.   Psychiatric/Behavioral: Negative.        Physical Exam   BP: 126/88  Heart Rate: 116  Temp: 101.3  F (38.5  C)  SpO2: 96 %      Physical Exam  Constitutional:       General: She is not in acute distress.     Appearance: She is normal weight. She is ill-appearing. She is not toxic-appearing.   HENT:      Head: Atraumatic.      Nose: Nose normal.      Mouth/Throat:      Mouth: Mucous membranes are moist.      Pharynx: Oropharynx is clear. Posterior oropharyngeal erythema present. No oropharyngeal exudate.   Eyes:      Pupils: Pupils are equal, round, and reactive to light.   Neck:      Musculoskeletal: Normal range of motion and neck supple.   Cardiovascular:      Rate and Rhythm: Regular rhythm. Tachycardia present.      Pulses: Normal pulses.      Heart sounds: Normal heart sounds.   Pulmonary:      Effort: Pulmonary effort is normal.      Breath sounds: Decreased air movement present. Examination of the right-middle field reveals rhonchi. Rhonchi present.   Abdominal:      General: Abdomen is flat. Bowel sounds are normal.      Palpations: Abdomen is soft.   Musculoskeletal: Normal range of motion.   Skin:     General: Skin is warm and dry.      Capillary Refill: Capillary refill takes less than 2 seconds.   Neurological:      General: No focal deficit present.      Mental Status: She is alert and oriented to person, place, and time.         ED Course     ED Course as of Oct 21 2034   Mon Oct 21, 2019   1947 Temp remains elevated at 101.4      2019 Pt temp is improving it is 100.9. She is accompanied by her spouse and two fussy small children. Pt states she needs to go home. I once again advised pt she has strep throat and RLL pneumonia. She needs to REST, increase he po  fluids, alternate tylenol and ibuprofen as needed for fever and body aches. I gave her strict return precautions advising she must return to ED if her symptoms worsen. Pt agrees with plan.         Procedures                   Results for orders placed or performed during the hospital encounter of 10/21/19 (from the past 24 hour(s))   Rapid strep screen   Result Value Ref Range    Specimen Description Throat     Rapid Strep A Screen (A)      POSITIVE: Group A Streptococcal antigen detected by immunoassay.   Chest XR,  PA & LAT    Narrative    PROCEDURE:  XR CHEST 2 VW    HISTORY: cough with fever, .    COMPARISON:  9/13/2016    FINDINGS:  The cardiomediastinal contours are normal.  The trachea is midline.  New subtle patchy opacity is present at the right lung base. No  effusion or pneumothorax is seen.    No suspicious osseous lesion or subdiaphragmatic free air.      Impression    IMPRESSION:      Right lower lobe pneumonia.      ERIC MORA MD       Medications   ibuprofen (ADVIL/MOTRIN) tablet 800 mg (800 mg Oral Given 10/21/19 1857)   acetaminophen (TYLENOL) tablet 975 mg (975 mg Oral Given 10/21/19 1959)   azithromycin (ZITHROMAX) tablet 500 mg (500 mg Oral Given 10/21/19 2020)       Assessments & Plan (with Medical Decision Making)     I have reviewed the nursing notes.    I have reviewed the findings, diagnosis, plan and need for follow up with the patient.      Discharge Medication List as of 10/21/2019  8:26 PM      START taking these medications    Details   azithromycin (ZITHROMAX Z-LASHON) 250 MG tablet First dose of two tablets given in ED at 2015. Take one tablet every evening for 4 days., Disp-4 tablet, R-0, E-Prescribe             Final diagnoses:   Strep throat   RLL pneumonia ([facility])       10/21/2019   [facility]     Jazlyn Nevarez NP  10/21/19 2034

## 2019-10-21 NOTE — ED AVS SNAPSHOT
[facility]  [address] [address]  Phone:  [phone]                                    Sarina Vila   MRN: [id number]    Department:  [facility]   Date of Visit:  10/21/2019           After Visit Summary Signature Page    I have received my discharge instructions, and my questions have been answered. I have discussed any challenges I see with this plan with the nurse or doctor.    ..........................................................................................................................................  Patient/Patient Representative Signature      ..........................................................................................................................................  Patient Representative Print Name and Relationship to Patient    ..................................................               ................................................  Date                                   Time    ..........................................................................................................................................  Reviewed by Signature/Title    ...................................................              ..............................................  Date                                               Time          22EPIC Rev 08/18

## 2019-10-21 NOTE — ED TRIAGE NOTES
Pt presents today with c/o sore throat, cough, nausea, sweats  Fevers, congestion. For 1 week. Tylenol and advil.Last had tylenol at 0900 this morning.

## 2019-10-22 NOTE — DISCHARGE INSTRUCTIONS
REST, increase oral fluids especially water. Alternate  tylenol and ibuprofen every 3-4 hours for fever and body aches. May take otc cough medications as needed.

## 2021-03-05 NOTE — ED AVS SNAPSHOT
[facility]    [address]    [place] [address]    Phone:  [phone]                                       Sarina Vila   MRN: [id number]    Department:  [facility]   Date of Visit:  4/6/2018           Patient Information     Date Of Birth          1990        Your diagnoses for this visit were:     Closed nondisplaced fracture of base of fifth metacarpal bone of right hand, initial encounter        You were seen by Selma Brown NP.      Follow-up Information     Follow up with Marcellus Martinez MD In 10 days.    Specialty:  Family Practice    Why:  For re-check.     Contact information:    [facility]  [address]  [place] [address] [phone]          Follow up with [facility].    Specialty:  EMERGENCY MEDICINE    Why:  As needed, If symptoms worsen    Contact information:    [address]  [place] [address] [phone]    Additional information:    From [place]: Take [address]. Turn left at [address]. Turn left at the first stoplight on [address]. At the first stop sign, take a right onto [address]. Take a left into the parking lot and continue through until you reach the North enterance of the building.       From [place]: Take [address]. Take the [address] ramp towards [place]. Turn left onto [address]. Take a slight right onto [address]. Turn left at the first stoplight on [address]. At the first stop sign, take a right onto [address]. Take a left into the parking lot and continue through until you reach the North enterance of the building.       From [place]: Take [address]. Take a right at [address]. At the first stop sign, take a right onto [address]. Take a left into the parking lot and continue through until you reach the North enterance of the building.         Discharge Instructions       Wear  Initially presented in new onset AFib with RVR and heart rate   Continued on metoprolol but increased from 25 mg to 50 mg b i d  Recent echo 11/20/20 with EF of 53%  Limited echo pending here given elevated BNP      Cardiology following and planning for cardiac catheterization today - significantly elevated trop splint until follow up with PCP.   Take tylenol and/or ibuprofen for pain. Follow dosing on package.   See RICE handout.   Elevate right hand as much as able.   Apply ice to right hand for 20 minutes every 1-2 hours while awake. Protect skin.   Follow up with PCP in 10 days for re-check.   Return to urgent care or emergency department with any increase in symptoms or concerns.         Discharge References/Attachments     HAND FRACTURE, CLOSED (ADULT) (ENGLISH)    RICE (ENGLISH)         Review of your medicines      Our records show that you are taking the medicines listed below. If these are incorrect, please call your family doctor or clinic.        Dose / Directions Last dose taken    HYDROcodone-acetaminophen 5-325 MG per tablet   Commonly known as:  NORCO   Dose:  1-2 tablet   Quantity:  30 tablet        Take 1-2 tablets by mouth every 6 hours as needed for moderate to severe pain   Refills:  0        ibuprofen 800 MG tablet   Commonly known as:  ADVIL/MOTRIN   Dose:  800 mg   Quantity:  40 tablet        Take 1 tablet (800 mg) by mouth every 8 hours as needed for moderate pain   Refills:  1                Procedures and tests performed during your visit     Hand XR, G/E 3 views, right    Radius/Ulna XR, PA & LAT, right    Splint      Orders Needing Specimen Collection     None      Pending Results     Date and Time Order Name Status Description    4/6/2018 1854 Radius/Ulna XR, PA & LAT, right In process     4/6/2018 1841 Hand XR, G/E 3 views, right In process             Pending Culture Results     No orders found from 4/4/2018 to 4/7/2018.            Thank you for choosing [facility]       Thank you for choosing [facility] for your care. Our goal is always to provide you with excellent care. Hearing back from our patients is one way we can continue to improve our services. Please take a few minutes to complete the written survey that you may receive in the mail after you visit with us. Thank you!        Sylvie  "Information     [employer] lets you send messages to your doctor, view your test results, renew your prescriptions, schedule appointments and more. To sign up, go to [email or web address] . Click on \"Log in\" on the left side of the screen, which will take you to the Welcome page. Then click on \"Sign up Now\" on the right side of the page.     You will be asked to enter the access code listed below, as well as some personal information. Please follow the directions to create your username and password.     Your access code is: [email or web address]  Expires: 2018  7:18 PM     Your access code will  in 90 days. If you need help or a new code, please call your [place] clinic or [phone].        Care EveryWhere ID     This is your Care EveryWhere ID. This could be used by other organizations to access your [facility] medical records  [id number]        Equal Access to Services     ARTEMIO CELIS AH: Hadii tom keeno Soabelino, waaxda luqadaha, qaybta kaalmada adejennifer, deangelo ewing . So [facility] [phone].    ATENCIÓN: Si habla español, tiene a kay disposición servicios gratuitos de asistencia lingüística. Llame al [phone].    We comply with applicable federal civil rights laws and [place] laws. We do not discriminate on the basis of race, color, national origin, age, disability, sex, sexual orientation, or gender identity.            After Visit Summary       This is your record. Keep this with you and show to your community pharmacist(s) and doctor(s) at your next visit.                  "

## 2021-05-25 ENCOUNTER — RECORDS - HEALTHEAST (OUTPATIENT)
Dept: ADMINISTRATIVE | Facility: CLINIC | Age: 31
End: 2021-05-25
